# Patient Record
Sex: FEMALE | Race: OTHER | Employment: FULL TIME | URBAN - METROPOLITAN AREA
[De-identification: names, ages, dates, MRNs, and addresses within clinical notes are randomized per-mention and may not be internally consistent; named-entity substitution may affect disease eponyms.]

---

## 2017-02-09 ENCOUNTER — OFFICE VISIT (OUTPATIENT)
Dept: FAMILY MEDICINE CLINIC | Age: 32
End: 2017-02-09

## 2017-02-09 VITALS
SYSTOLIC BLOOD PRESSURE: 118 MMHG | DIASTOLIC BLOOD PRESSURE: 47 MMHG | WEIGHT: 114 LBS | HEIGHT: 57 IN | BODY MASS INDEX: 24.59 KG/M2 | TEMPERATURE: 97.6 F | HEART RATE: 70 BPM

## 2017-02-09 DIAGNOSIS — B35.4 TINEA CORPORIS: Primary | ICD-10-CM

## 2017-02-09 DIAGNOSIS — R10.13 EPIGASTRIC PAIN: ICD-10-CM

## 2017-02-09 RX ORDER — DICYCLOMINE HYDROCHLORIDE 20 MG/1
20 TABLET ORAL
Qty: 30 TAB | Refills: 1 | Status: SHIPPED | OUTPATIENT
Start: 2017-02-09 | End: 2018-03-15

## 2017-02-09 RX ORDER — KETOCONAZOLE 20 MG/G
CREAM TOPICAL 2 TIMES DAILY
Qty: 30 G | Refills: 0 | Status: SHIPPED | OUTPATIENT
Start: 2017-02-09

## 2017-02-09 RX ORDER — PHENOL/SODIUM PHENOLATE
AEROSOL, SPRAY (ML) MUCOUS MEMBRANE
Qty: 60 TAB | Refills: 2 | Status: SHIPPED | OUTPATIENT
Start: 2017-02-09 | End: 2018-03-15

## 2017-02-09 NOTE — PROGRESS NOTES
Subjective:     Chief Complaint   Patient presents with    Diarrhea     pt c/o really bad cramps, diarrhea and nause and some fever x3 days    Skin Problem     pt c/o itchy patch on left side of cheek on face        She  is a 33 y.o. female who presents for evaluation of stomach pain, diarrhea and nausea x 3 days. Pt notes every time she eats, she has to go the bathroom and passes soft stools. Pt reports she has been able to hydrated. Only attempted remedies, no OTC or Rx medications. Also complains     No vomiting, only diarrhea and trace nausea. No blood in diarrhea. Intermittent tactile fevers. Approx 4-5 bouts/day of loose stools. Saw GI specialist last in Nov who was Tx'ing her for \"infection\" and Dr Jose Smith saw Pt last on 11/10 and was going to check more labs. ROS  Gen - no fever/chills  Resp - no dyspnea or cough  CV - no chest pain or WALTERS  Rest per HPI    Past Medical History   Diagnosis Date    H/O mammogram 2012    Pap smear for cervical cancer screening 2012     No past surgical history on file. Current Outpatient Prescriptions on File Prior to Visit   Medication Sig Dispense Refill    dicyclomine (BENTYL) 20 mg tablet Take 20 mg by mouth four (4) times daily as needed.  Omeprazole delayed release (PRILOSEC D/R) 20 mg tablet Sig 1 po bid. [place] 1 [employer] veces al [place] 60 Tab 2    Lactobacillus acidophilus (ACIDOPHILUS) cap Take 2 Caps by mouth two (2) times a day. 120 Cap 1     No current facility-administered medications on file prior to visit.          Objective:     Vitals:    02/09/17 1046   BP: 118/47   Pulse: 70   Temp: 97.6 °F (36.4 °C)   TempSrc: Oral   Weight: 114 lb (51.7 kg)   Height: 4' 9.28\" (1.455 m)       Physical Examination:  General appearance - alert, well appearing, and in no distress  Eyes -sclera anicteric  Neck - supple, no significant adenopathy, no thyromegaly  Chest - clear to auscultation, no wheezes, rales or rhonchi, symmetric air entry  Heart - normal rate, regular rhythm, normal S1, S2, no murmurs, rubs, clicks or gallops  Neurological - alert, oriented, no focal findings or movement disorder noted  Abdomen-BS present/WNL x 4 quads, non-tender/distended, soft,no organomegaly  Derm-anular, hyperpigmented rash on Pt's left cheek approx 2 x 2 cm. Assessment/ Plan:   Liza Lugo was seen today for diarrhea and skin problem. Diagnoses and all orders for this visit:    Tinea corporis  -     ketoconazole (NIZORAL) 2 % topical cream; Apply  to affected area two (2) times a day. Epigastric pain  -     dicyclomine (BENTYL) 20 mg tablet; Take 1 Tab by mouth four (4) times daily as needed. -     Omeprazole delayed release (PRILOSEC D/R) 20 mg tablet; Sig 1 po bid. [place] 1 [employer] veces al [place]      Recommend resumption of PPI and short course of Bentyl. Complete ROIs for GI specialist and [facility]. Re-eval in 4-6 weeks. Start topical antifungal for rash on L cheek. Discussed dosing x 1 week post rash resolution. I have discussed the diagnosis with the patient and the intended plan as seen in the above orders. The patient has received an after-visit summary and questions were answered concerning future plans. I have discussed medication side effects and warnings with the patient as well. The patient verbalizes understanding and agreement with the plan. Follow-up Disposition:  Return in about 4 weeks (around 3/9/2017).

## 2017-02-09 NOTE — PROGRESS NOTES
Coordination of Care  1. Have you been to the ER, urgent care clinic since your last visit? Hospitalized since your last visit? No    2. Have you seen or consulted any other health care providers outside of the [facility] [place] since your last visit? Include any pap smears or colon screening. No    Medications  Medication Reconciliation Performed: no  Patient does not need refills     Learning Assessment Complete?  yes

## 2017-02-09 NOTE — PATIENT INSTRUCTIONS
Tiña: Instrucciones de cuidado - [ Ringworm: Care Instructions ]  Instrucciones de cuidado  La tiña es marty infección de la piel causada por un hongo. No es causada por un gusano. La tiña provoca un salpullido redondeado, con escamas, que podría agrietarse y producir comezón. El salpullido puede extenderse por marty amplia nikko. Un tipo de hongo que causa tiña suele ser [employer] vestuarios y las piscinas ([place]). Crece en zonas tibias y húmedas de la piel, miladis en los pliegues. La tiña puede contagiarse compartiendo toallas, ropa o equipo deportivo. También si se toca a alguien que tiene [facility]. La tiña se trata con marty crema que Bethanne Bidding. Si el salpullido se extiende, podría necesitar pastillas para eliminarlo. A menudo la tiña reaparece después del tratamiento. Si el salpullido se infecta con bacterias, podría necesitar antibióticos. La atención de seguimiento es marty parte clave de carter tratamiento y seguridad. Asegúrese de hacer y acudir a todas las citas, y llame a carter médico si está teniendo problemas. También es marty buena idea saber los resultados de los exámenes y mantener marty lista de los medicamentos que marcie. ¿Cómo puede cuidarse en el hogar? · [employer] medicamentos exactamente miladis le fueron recetados. Llame a carter médico si tiene algún problema con los medicamentos. · Lávese el salpullido con agua y Rexann [place] y séquese earnest. · Trate de aplicar marty crema de venta jamin con clotrimazol o miconazol sobre el salpullido. Otros nombres de garry incluyen Lotrimin, Micatin y Mc jessica. La crema con terbinafina (Lamisil) también está disponible sin receta médica. Extienda la crema más allá del borde del salpullido. Siga las instrucciones del envase. No deje de usar el medicamento solo porque la piel parezca shannon sanado. Es probable que necesite seguir el tratamiento entre 2 y 3 semanas.   · Para evitar otra infección:  ¨ No camine descalzo en lugares públicos miladis gimnasios o vestuarios. Evite compartir toallas y ropa. Use chanclas u otro tipo de calzado en la ducha. ¨ No use ropa ajustada ni deje la piel mojada demetria mucho tiempo, miladis cuando se janene puesto un traje de baño mojado o ropas sudadas. ¿Cuándo debe pedir ayuda? Llame a carter médico ahora mismo o busque atención médica inmediata si:  · Parece que el salpullido se extiende, incluso después del tratamiento. · Tiene señales de infección, tales miladis:  ¨ Dolor, hinchazón o aumento de la temperatura de la piel. ¨ Vetas rojizas cerca de marty herida en la piel. ¨ Pus que supura del salpullido. Willa Hilliard. Preste especial atención a los cambios en carter dylon y asegúrese de comunicarse con carter médico si:  · La tiña no desaparece después de 2 semanas de tratamiento con crema antimicótica (contra los hongos) de venta jamin. ¿Dónde puede encontrar más información en inglés? Maria Teresa Desir a http://irene-danny.info/. Jason Godoy [id number] en la búsqueda para aprender más acerca de \"Tiña: Instrucciones de cuidado - [ Ringworm: Care Instructions ]. \"  Revisado: 5 febrero, 2016  Versión del contenido: 11.1  © 6301-7554 Healthwise, Incorporated. Las instrucciones de cuidado fueron adaptadas bajo licencia por [employer] (which disclaims liability or warranty for this information). Si usted tiene [employer] [place] afección médica o sobre estas instrucciones, siempre pregunte a carter profesional de dylon. Healthwise, Incorporated niega toda garantía o responsabilidad por carter uso de esta información.

## 2017-02-09 NOTE — PROGRESS NOTES
Statements below were documented by Dirk Cavazos RN Patient discharged home with AVS and Medication teaching . No further questions. Completed and signed release of records forms to receive records from [facility] and [address] [place] [address]. Dr. Jacek Arvizu . Patient to see registrar to make follow up appointment.  Dirk Cavazos RN

## 2017-04-03 ENCOUNTER — OFFICE VISIT (OUTPATIENT)
Dept: FAMILY MEDICINE CLINIC | Age: 32
End: 2017-04-03

## 2017-04-03 VITALS
DIASTOLIC BLOOD PRESSURE: 57 MMHG | TEMPERATURE: 98 F | HEART RATE: 71 BPM | HEIGHT: 57 IN | BODY MASS INDEX: 25.46 KG/M2 | SYSTOLIC BLOOD PRESSURE: 110 MMHG | WEIGHT: 118 LBS

## 2017-04-03 DIAGNOSIS — K64.1 SECOND DEGREE HEMORRHOIDS: Primary | ICD-10-CM

## 2017-04-03 RX ORDER — HYDROCORTISONE ACETATE 25 MG/1
25 SUPPOSITORY RECTAL EVERY 12 HOURS
Qty: 12 SUPPOSITORY | Refills: 2 | Status: SHIPPED | OUTPATIENT
Start: 2017-04-03 | End: 2018-03-15

## 2017-04-03 NOTE — PROGRESS NOTES
Coordination of Care  1. Have you been to the ER, urgent care clinic since your last visit? Hospitalized since your last visit? No    2. Have you seen or consulted any other health care providers outside of the [address] [place] [address] since your last visit? Include any pap smears or colon screening. No    Medications  Medication Reconciliation Performed: no  Patient does not need refills     Learning Assessment Complete?  yes

## 2017-04-03 NOTE — PATIENT INSTRUCTIONS
Hydrocortisone (Dentro de el recto)   Trata la inflamación del recto y el ano. Puede que Shereen Holy se use para tratar la colitis ulcerativa y trastornos similares. Diana medicamento es un corticosteroide. Katie(s):Alla-Med Hc, Anucort-HC, Anusol-HC, Cortifoam, GRx HiCort 25, Hemmorex-HC, Procto-Kit 1%, Procto-Kit 2.5%, Procto-José Manuel, Proctocort, Proctozone-Hc, Rectacort-HC, Rectasol-Hc   Existen muchas otras marcas de [place]. Diana medicamento no debe ser usado cuando:   Diana medicamento no es adecuado para todas las personas. No lo use si usted alguna vez ha tenido marty reacción alérgica a la hidrocortisona o si usted tiene marty infección de hongos. Forma de usar diana medicamento:   Crema, Supositorio, Espuma  · Tómese carter medicamento según derian indicaciones. Puede ser necesario cambiar carter dosis varias veces hasta encontrar la dosis más conveniente para usted. · Lave derian saman con agua y jabón antes y después de insertar el medicamento. · Siga las instrucciones que vienen con el medicamento para preparar la espuma o supositorio. · Espuma:   ¨ No use el recipiente del aerosol para aplicarse el medicamento directamente en el recto. Solamente use el aplicador de espuma que viene con el medicamento. ¨ Conecte el aplicador a la tapa del recipiente del aerosol. Agite el recipiente por 5 a 10 segundos shereen antes de usarlo. ¨ Lave el aplicador de la espuma con agua tibia y jabón después de usarlo. ¨ Guarde la espuma a temperatura ambiente y lejos del calor y la iván directa. No la refrigere. · Supositorio:   ¨ Quite el papel aluminio o envoltura del supositorio antes de aplicárselo. ¨ Después de aplicarse el medicamento, quédese acostado por aproximadamente 15 minutos para que el supositorio no se salga antes de derretirse. Luego lávese las saman otra vez. ¨ Usted puede guardar los supositorios en el refrigerador sriram no los congele.   · Dosis olvidada: Si olvida marty dosis de [employer], tómelo lo [employer] posible. Si es radhika hora de carter próxima dosis, omita la dosis olvidada y [employer] carter dosis regular. No use medicamento adicional para reponer la dosis olvidada. Medicamentos y [employer] que debe evitar:   Consulte con carter médico o farmacéutico antes de usar cualquier medicamento, incluyendo los que compra sin receta médica, las vitaminas y los productos herbales. · Algunos medicamentos y alimentos pueden afectar el funcionamiento de la hidrocortisona. Dígale a carter médico si usted [employer]es alguno de los siguientes:   ¨ Anfotericina B, aminoglutetimida, carbamazepina, colestiramina, ciclosporina, digitalis, isoniazid, ketoconazol o fenitoína  ¨ Antibióticos, incluyendo azitromicina, claritromicina, eritromicina o rifampina  ¨ Anticoagulante, incluyendo warfarina  ¨ Diurético  ¨ Estrógeno, incluyendo anticonceptivos de uso oral  ¨ Insulina u otros medicamentos para la diabetes  ¨ AINEs, incluyendo aspirina, diclofenac, ibuprofeno, naproxeno o celecoxib  · Diana medicamento podría interferir con las vacunas. Pregunte a carter médico antes de recibir [employer] para gripe o cualquier otra vacuna. Precauciones demetria el uso de diana medicamento:   · Dígale a carter médico si usted está embarazada o dando de lactar, si tiene problemas cardíacos, presión arterial ander, diabetes, enfermedad del riñón, cirrosis, osteoporosis, problemas de la tiroides o miastenia grave. Dígale a carter médico si usted tiene problemas digestivos, incluyendo úlceras, colitis o diverticulitis. · Es probable que diana medicamento cause los siguientes problemas:  ¨ Cataratas o glaucoma  ¨ Presión arterial ander  ¨ Riesgo aumentado de osteoporosis  ¨ Problemas de la glándula suprarrenal  · Es probable que diana medicamento le provoque infecciones con más facilidad. Dígale a carter médico si usted tiene algún tipo de infección antes de iniciar el tratamiento. Evite personas enfermas, o que tengan infecciones.  Si usted está expuesto a la varicela o el sarampión, dígale a carter médico de inmediato. · Diana medicamento puede atrasar el crecimiento en niños. Si usted ana que carter aston no está creciendo [employer] medicamento, hable con carter médico.  · Llame a carter médico si derian síntomas no mejoran, o si Lovena Abhishek. · No suspenda el uso de diana medicamento súbitamente. Carter médico necesitará disminuir carter dosis poco a poco antes de suspender el medicamento por completo. · Dígale a todo médico o dentista encargado de atenderle que usted está usando [employer]. Puede que diana medicamento afecte algunos resultados de [employer]. · Guarde todos los medicamentos fuera del alcance de los niños y nunca comparta derian medicamentos con otras personas. Efectos secundarios que pueden presentarse demetria el uso de diana medicamento:   Consulte inmediatamente con el médico si nota cualquiera de estos efectos secundarios:  · Reacción alérgica: picazón o ronchas, hinchazón en la cedric o en las saman, hinchazón o cosquilleo en la boca o garganta, opresión en el pecho, dificultad para respirar  · Pecas oscuras, cambios en el color de la piel, frío, debilidad, cansancio, náusea, vómito, pérdida de peso  · Depresión, cambios de humor, dificultad para dormir, pensamientos, sentimientos o comportamientos inusuales  · [address], más sed, calambres musculares, náusea o vómito, ritmo cardíaco irregular  · Dolor de vanessa, cambios en la vista  · Monster, escalofríos, tos, dolor de garganta, yeison de cuerpo  · Subida de peso rápida, inflamación en las saman, tobillos o pies  Consulte con el médico si nota los siguientes efectos secundarios menos graves:   · Irritación, ardor o comezón rectal  Consulte con el médico si nota otros efectos secundarios que ana son causados por diana medicamento. [place] a carter médico para consejo médico sobre efectos secundarios. Usted puede reportar derian efectos secundarios al FDA al [phone].   © 2016 [address] is for End User's use only and may not be sold, redistributed or otherwise used for commercial purposes. Esta información es sólo para uso en educación. Carter intención no es darle un consejo médico sobre enfermedades o tratamientos. Colsulte con carter [place] farmacéutico antes de seguir cualquier régimen médico para saber si es seguro y efectivo para usted.

## 2017-04-03 NOTE — MR AVS SNAPSHOT
Visit Information Felisha Gu Personal Médico Departamento Teléfono del [facility]. Número de visita 4/3/2017  1:15 PM Hipolito Ortiz NP KAMRON-GINTER Luh Edel Naalehu [phone] Follow-up Instructions Return in about 6 weeks (around 5/15/2017). Upcoming Health Maintenance Date Due DTaP/Tdap/Td series (1 - Tdap) 6/12/2004 PAP AKA CERVICAL CYTOLOGY 6/12/2004 INFLUENZA AGE 9 TO ADULT 8/1/2016 Alergias  Review Complete El: 4/3/2017 Por: Hipolito Ortiz NP  
 Kedar Mendoza del:  4/3/2017 No Known Allergies Vacunas actuales Marcellus Cheadle Marjie Christian Influenza Vaccine PF 11/23/2013 No revisadas esta visita You Were Diagnosed With   
  
 Melanie Back Second degree hemorrhoids    -  Primary ICD-10-CM: K64.1 ICD-9-CM: 455.8 Partes vitales PS Pulso Temperatura [place] ( percentil de crecimiento) Peso (percentil de crecimiento) LMP (última celeste) 110/57 (BP 1 Location: Left arm, BP Patient Position: Sitting) 71 98 °F (36.7 °C) (Oral) 4' 9.28\" (1.455 m) 118 lb (53.5 kg) 03/17/2017 BMI [facility]) Estado obstétrico Estatus de tabaquísmo 25.28 kg/m2 Having regular periods Never Smoker Historial de signos vitales BMI and BSA Data Body Mass Index Body Surface Area  
 25.28 kg/m 2 1.47 m 2 Julio [email or web address] Pharmacy Name Phone [facility] [phone] [facility], [address] [facility] [phone] Harvey lista de medicamentos actualizada Lista actualizada el: 4/3/17  2:40 PM.  Novaklucien Demarco use harvey lista de medicamentos más reciente. dicyclomine 20 mg tablet También conocido miladis:  BENTYL Take 1 Tab by mouth four (4) times daily as needed. hydrocortisone 25 mg Supp También conocido miladis:  ANUSOL-HC Insert 1 Suppository into rectum every twelve (12) hours.   
  
 ketoconazole 2 % topical cream  
 También conocido miladis:  Vy Dickerson Apply  to affected area two (2) times a day. Lactobacillus acidophilus Cap También conocido miladis:  ACIDOPHILUS Take 2 Caps by mouth two (2) times a day. Omeprazole delayed release 20 mg tablet También conocido miladis:  PRILOSEC D/R Sig 1 po bid. [place] 1 [employer] al michoacano Recetas Enviado a la [place] Refills  
 hydrocortisone (ANUSOL-HC) 25 mg supp 2 Sig: Insert 1 Suppository into rectum every twelve (12) hours. Class: Normal  
 Pharmacy: [facility], [address] [place] [address] #: [phone] Route: Rectal  
  
Instrucciones de seguimiento Return in about 6 weeks (around 5/15/2017). Instrucciones para el Paciente Hydrocortisone (Dentro de el recto) Trata la inflamación del recto y el ano. Puede que [place] se use para tratar la colitis ulcerativa y trastornos similares. Diana medicamento es un corticosteroide. Katie(s):Alla-Med Hc, Anucort-HC, Anusol-HC, Cortifoam, GRx HiCort 25, Hemmorex-HC, Procto-Kit 1%, Procto-Kit 2.5%, Procto-José Manuel, Proctocort, Proctozone-Hc, Rectacort-HC, Rectasol-Hc Existen muchas otras marcas de Dueñas. Diana medicamento no debe ser usado cuando:  
Diana medicamento no es adecuado para todas las personas. No lo use si usted alguna vez ha tenido marty reacción alérgica a la hidrocortisona o si usted tiene marty infección de hongos. Forma de usar diana medicamento:  
Antoine [place] · Tómese carter medicamento según derian indicaciones. Puede ser necesario cambiar carter dosis varias veces hasta encontrar la dosis más conveniente para usted. · Lave derian saman con agua y jabón antes y después de insertar el medicamento. · Siga las instrucciones que vienen con el medicamento para preparar la espuma o supositorio.  
· Espuma:  
¨ No use el recipiente del aerosol para aplicarse el medicamento directamente en el recto. Solamente use el aplicador de espuma que viene con el medicamento. ¨ Conecte el aplicador a la tapa del recipiente del aerosol. Agite el recipiente por 5 a 10 segundos shereen antes de usarlo. ¨ Lave el aplicador de la espuma con agua tibia y jabón después de usarlo. ¨ Guarde la espuma a temperatura ambiente y lejos del calor y la iván directa. No la refrigere. · Supositorio: ¨ Quite el papel aluminio o envoltura del supositorio antes de aplicárselo. ¨ Después de aplicarse el medicamento, quédese acostado por aproximadamente 15 minutos para que el supositorio no se salga antes de derretirse. Luego lávese las saman otra vez. ¨ Usted puede guardar los supositorios en el refrigerador sriram no los congele. · Dosis olvidada: Si olvida marty dosis de carter medicamento, tómelo lo más pronto posible. Si es radhika hora de carter próxima dosis, omita la dosis olvidada y [employer] carter dosis regular. No use medicamento adicional para reponer la dosis olvidada. Medicamentos y [employer] Tire que debe evitar:  
Consulte con carter médico o farmacéutico antes de usar cualquier medicamento, incluyendo los que compra sin receta médica, las vitaminas y los productos herbales. · Algunos medicamentos y alimentos pueden afectar el funcionamiento de la hidrocortisona. Dígale a carter médico si usted Mailgun usando alguno de los siguientes: ¨ Anfotericina B, aminoglutetimida, carbamazepina, colestiramina, ciclosporina, digitalis, isoniazid, ketoconazol o fenitoína ¨ Antibióticos, incluyendo azitromicina, claritromicina, eritromicina o rifampina ¨ Anticoagulante, incluyendo Thom Holstein ¨ Diurético 
¨ Estrógeno, incluyendo anticonceptivos de uso oral 
¨ Insulina u otros medicamentos para la diabetes ¨ AINEs, incluyendo aspirina, diclofenac, ibuprofeno, naproxeno o celecoxib · Diana medicamento podría interferir con las vacunas. Pregunte a carter médico antes de recibir Bill Blue para gripe o cualquier otra vacuna. Precauciones demetria el uso de diana medicamento:  
· Dígale a carter médico si usted está embarazada o dando de lactar, si tiene problemas cardíacos, presión arterial ander, diabetes, enfermedad del riñón, cirrosis, osteoporosis, problemas de la tiroides o miastenia grave. Dígale a carter médico si usted tiene problemas digestivos, incluyendo úlceras, colitis o diverticulitis. · Es probable que diana medicamento cause los siguientes problemas: 
¨ Cataratas o glaucoma ¨ Presión arterial ander ¨ Riesgo aumentado de osteoporosis ¨ Problemas de la glándula suprarrenal 
· Es probable que diana medicamento le provoque infecciones con más facilidad. Dígale a carter médico si usted tiene algún tipo de infección antes de iniciar el tratamiento. Evite personas enfermas, o que tengan infecciones. Si usted está expuesto a la varicela o el sarampión, dígale a carter médico de inmediato. · Diana medicamento puede atrasar el crecimiento en niños. Si usted ana que carter aston no está creciendo [place] medicamento, hable con carter médico. 
· Llame a carter médico si derian síntomas no mejoran, o si [place] [employer]. · No suspenda el uso de diana medicamento súbitamente. Carter médico necesitará disminuir carter dosis poco a poco antes de suspender el medicamento por completo. · Dígale a todo médico o dentista encargado de atenderle que usted está usando [place]. Puede que diana medicamento afecte algunos resultados de imageloop. · Guarde todos los medicamentos fuera del alcance de los niños y nunca comparta derian medicamentos con otras personas. Efectos secundarios que pueden presentarse demetria el uso de diana medicamento:  
Consulte inmediatamente con el médico si nota cualquiera de estos efectos secundarios: 
· Reacción alérgica: [place] o ronchas, hinchazón en la cedric o en las saman, hinchazón o cosquilleo en la boca o garganta, opresión en el pecho, dificultad para respirar · Pecas oscuras, cambios en el color de la piel, frío, debilidad, cansancio, náusea, vómito, pérdida de [address] · Depresión, cambios de humor, dificultad para dormir, pensamientos, sentimientos o comportamientos inusuales · [employer], más sed, calambres musculares, náusea o vómito, ritmo cardíaco irregular · Dolor de vanessa, cambios en la vista · Karina Chandana, escalofríos, tos, dolor de garganta, yeison de cuerpo · Subida de peso rápida, inflamación en las saman, tobillos o pies Consulte con el médico si nota los siguientes efectos secundarios menos graves: · Irritación, ardor o comezón rectal 
Consulte con el médico si nota otros efectos secundarios que ana son causados por sapphire medicamento. [place] a harvey médico para consejo médico sobre efectos secundarios. Usted puede reportar derian efectos secundarios al FDA al [phone]. © 2016 [address] is for End User's use only and may not be sold, redistributed or otherwise used for commercial purposes. Esta información es sólo para uso en educación. Harvey intención no es darle un consejo médico sobre enfermedades o tratamientos. Colsulte con harvey Hellen Waqas farmacéutico antes de seguir cualquier régimen médico para saber si es seguro y efectivo para usted. Introducing [facility]! Guero Cristina introduce portal paciente MyChart . Ahora se puede acceder a partes de harvey expediente médico, enviar por correo electrónico la oficina de harvey médico y solicitar renovaciones de medicamentos en línea. En harvey navegador de Internet , Frutoso Dixon a [email or web address] Venus clic en el usuario por Ernesto Almendarez? Madelyn Pilar clic aquí en la sesión Carlos Alberto Flow. Verá la página de registro [place]. Ingrese harvey código de [facility] rosemarie y miladis aparece a continuación. Usted no tendrá que [employer] código después de shannon completado el proceso de registro .  Si usted no se inscribe antes de la fecha de caducidad , debe solicitar un nuevo código. · MyChart Código de acceso : [id number] Expires: 7/2/2017  2:40 PM 
 
Ingresa los últimos cuatro dígitos de carter Número de Seguro Social ( xxxx ) y fecha de nacimiento ( dd / mm / aaaa ) miladis se indica y venus clic en Enviar. Usted será llevado a la siguiente página de registro . Crear un ID MyChart . Esta será carter ID de inicio de sesión de MyChart y no puede ser [place] , por lo que pensar en marty que es Louise Blanks y fácil de recordar . Crear marty contraseña MyChart . Usted puede cambiar carter contraseña en cualquier momento . Ingrese carter Password Reset de preguntas y Mccann . [place] se puede utilizar en un momento posterior si usted olvida carter contraseña. Introduzca carter dirección de correo electrónico . Sharon Staples recibirá marty notificación por correo electrónico cuando la nueva información está disponible en MyChart . Idella Mosinee clic en Registrarse. Shane Jazmin sonny y descargar porciones de carter expediente médico. 
Venus clic en el enlace de descarga del menú Resumen para descargar marty copia portátil de carter información médica . Si tiene [employer] , por favor visite la sección de preguntas frecuentes del sitio web MyChart . Recuerde, MyChart NO es que se utilizará para las necesidades urgentes. Para emergencias médicas , llame al 911 . Ahora disponible en carter iPhone y Android ! Por favor proporcione sapphire resumen de la documentación de cuidado a carter próximo proveedor. Your primary care clinician is listed as NONE. If you have any questions after today's visit, please call [phone].

## 2017-04-03 NOTE — PROGRESS NOTES
Subjective:     Chief Complaint   Patient presents with    Follow-up     abdominal pain and anal pain        She  is a 33 y.o. female who presents for re-evaluation of GERD S&S and Tx from [address] [place] [address]. Pt notes her abdominal pain resolved after one week after starting Rx. Pt has noted some intermittent rectal pain. Onset > 1 year ago and had a colonscopy done approx 9 months ago that revealed   Hemorrhoids. Pain is intermittent. Has noted some intermittent abdominal bloating before meals. Intermittent constipation/straining. Denies any N/V/D. Saw GI through [facility] when she was a Pt at [facility]. ROS  Gen - no fever/chills  Resp - no dyspnea or cough  CV - no chest pain or WALTERS  Rest per HPI    Past Medical History:   Diagnosis Date    H/O mammogram 2012    Pap smear for cervical cancer screening 2012     No past surgical history on file. Current Outpatient Prescriptions on File Prior to Visit   Medication Sig Dispense Refill    dicyclomine (BENTYL) 20 mg tablet Take 1 Tab by mouth four (4) times daily as needed. 30 Tab 1    Omeprazole delayed release (PRILOSEC D/R) 20 mg tablet Sig 1 po bid. [place] 1 [place] veces al michoacano 60 Tab 2    ketoconazole (NIZORAL) 2 % topical cream Apply  to affected area two (2) times a day. 30 g 0    Lactobacillus acidophilus (ACIDOPHILUS) cap Take 2 Caps by mouth two (2) times a day. 120 Cap 1     No current facility-administered medications on file prior to visit.          Objective:     Vitals:    04/03/17 1319   BP: 110/57   Pulse: 71   Temp: 98 °F (36.7 °C)   TempSrc: Oral   Weight: 118 lb (53.5 kg)   Height: 4' 9.28\" (1.455 m)       Physical Examination:  General appearance - alert, well appearing, and in no distress  Eyes -sclera anicteric  Neck - supple, no significant adenopathy, no thyromegaly  Chest - clear to auscultation, no wheezes, rales or rhonchi, symmetric air entry  Heart - normal rate, regular rhythm, normal S1, S2, no murmurs, rubs, clicks or gallops  Neurological - alert, oriented, no focal findings or movement disorder noted  Abdomen-BS present/WNL x 4 quads, non-tender/distended, soft,no organomegaly    Assessment/ Plan:   Melissa Spann was seen today for follow-up. Diagnoses and all orders for this visit:    Second degree hemorrhoids  -     hydrocortisone (ANUSOL-HC) 25 mg supp; Insert 1 Suppository into rectum every twelve (12) hours. Reviewed extensive records of imaging, colonoscopy and lab work done by PCP and GI. Hemorrhoids noted on colonoscopy report. Given Pt's S&S today and her prior exam findings, recommend Tx w/ anusol HC supp BID x 3-4 days then PRN. Re-eval S&S in 4-6 weeks. ? Bloating cause. Resume PPI. I have discussed the diagnosis with the patient and the intended plan as seen in the above orders. The patient has received an after-visit summary and questions were answered concerning future plans. I have discussed medication side effects and warnings with the patient as well. The patient verbalizes understanding and agreement with the plan. Follow-up Disposition:  Return in about 6 weeks (around 5/15/2017).

## 2017-04-03 NOTE — PROGRESS NOTES
AVS printed and reviewed. Good Rx wallet card given for generic Anusol supp for approx cost of $14 at [facility] pharmacy. Sent to registration to schedule f/u appt. Discussion assisted by CAV , Chinyere Alvarez.

## 2017-05-15 ENCOUNTER — OFFICE VISIT (OUTPATIENT)
Dept: FAMILY MEDICINE CLINIC | Age: 32
End: 2017-05-15

## 2017-05-15 VITALS
DIASTOLIC BLOOD PRESSURE: 62 MMHG | SYSTOLIC BLOOD PRESSURE: 119 MMHG | WEIGHT: 117 LBS | HEIGHT: 58 IN | HEART RATE: 74 BPM | TEMPERATURE: 97.5 F | BODY MASS INDEX: 24.56 KG/M2

## 2017-05-15 DIAGNOSIS — K64.9 HEMORRHOIDS, UNSPECIFIED HEMORRHOID TYPE: Primary | ICD-10-CM

## 2017-05-15 DIAGNOSIS — K62.89 RECTAL PAIN: ICD-10-CM

## 2017-05-15 NOTE — PROGRESS NOTES
Printed AVS, provided to pt and reviewed. Pt indicated understanding and had no questions. Explained Access Now process to pt and told them that one of our financial screeners will call them at home for financial information. Samuel Jones was the .  Felicia Nunez RN

## 2017-05-15 NOTE — PROGRESS NOTES
Coordination of Care  1. Have you been to the ER, urgent care clinic since your last visit? Hospitalized since your last visit? No    2. Have you seen or consulted any other health care providers outside of the [address] [place] [address] since your last visit? Include any pap smears or colon screening. Yes When: april 21, 2017 Where: [facility] dept Reason for visit: pelvic exam    Medications  Medication Reconciliation Performed: no  Patient does not know need refills     Learning Assessment Complete?  yes

## 2017-05-15 NOTE — PROGRESS NOTES
Assessment/Plan:    Collin Evangelista was seen today for abdominal pain. Diagnoses and all orders for this visit:    Hemorrhoids, unspecified hemorrhoid type  -     REFERRAL TO COLON AND RECTAL SURGERY    Rectal pain  -     REFERRAL TO COLON AND RECTAL SURGERY    Please also see note from 4/17 which includes more History of present illness  No relief with anusol  Would appreciate opinion of specialist for this chronic problem  Get records from recent office visit with gyn        Follow-up Disposition:  Return if symptoms worsen or fail to improve. Vi Hall PA-C  [address] [place], [place] [address]juan expressed understanding of this plan. An AVS was printed and given to the patient.      ----------------------------------------------------------------------    Chief Complaint   Patient presents with    Abdominal Pain     f/u pain is still the same       History of Present Illness:    Pt here for f/up on hemorrhoid and anal pain after starting anusol-HC for this problem. The problem started about 1 year ago and was associated with anal pain, blood with wiping after BM, pus at times in BM. She has seen GI and had colonoscopy and endoscopy, rx for H. Pylori with reported negative f/up testing, recent gyn eval twice including a pelvic ultrasound (she was told that the results are normal)    Past Medical History:   Diagnosis Date    H/O mammogram 2012    Pap smear for cervical cancer screening 2012       Current Outpatient Prescriptions   Medication Sig Dispense Refill    hydrocortisone (ANUSOL-HC) 25 mg supp Insert 1 Suppository into rectum every twelve (12) hours. 12 Suppository 2    dicyclomine (BENTYL) 20 mg tablet Take 1 Tab by mouth four (4) times daily as needed. 30 Tab 1    Omeprazole delayed release (PRILOSEC D/R) 20 mg tablet Sig 1 po bid. [place] [address] [employer] veces al michoacano 60 Tab 2    ketoconazole (NIZORAL) 2 % topical cream Apply  to affected area two (2) times a day.  30 g 0    Lactobacillus acidophilus (ACIDOPHILUS) cap Take 2 Caps by mouth two (2) times a day. 120 Cap 1       No Known Allergies    Social History   Substance Use Topics    Smoking status: Never Smoker    Smokeless tobacco: None    Alcohol use No       No family history on file.     Physical Exam:     Visit Vitals    /62 (BP 1 Location: Right arm, BP Patient Position: Sitting)    Pulse 74    Temp 97.5 °F (36.4 °C) (Oral)    Ht 4' 9.76\" (1.467 m)    Wt 117 lb (53.1 kg)    LMP 05/02/2017    BMI 24.66 kg/m2     Looks well  A&Ox3  WDWN NAD  Respirations normal and non labored

## 2017-05-15 NOTE — PATIENT INSTRUCTIONS
Dolor anal: Instrucciones de cuidado - [ Anal Pain: Care Instructions ]  Instrucciones de cuidado  El dolor en la abertura del recto (ano) puede ser causado por diarrea, estreñimiento o rascarse debido a comezón rectal. Kayla Marek causa común del dolor anal es un desgarro en el revestimiento del recto inferior (fisura anal). Diana tipo de dolor anal normalmente desaparece cuando se elimina el problema. Las lesiones producidas demetria la práctica de sexo anal o por un objeto colocado en el recto también pueden causar dolor. Bri causa poco frecuente del dolor anal son los espasmos de los músculos del recto. Algunas de estas afecciones podrían causar un leve sangrado. El tratamiento en el hogar por lo general puede aliviar el dolor anal. Si persiste el dolor anal, carter médico podría recetarle medicamentos para aliviar el dolor y otros síntomas. Según la causa, es posible que necesite otro tratamiento. La atención de seguimiento es rbi parte clave de carter tratamiento y seguridad. Asegúrese de hacer y acudir a todas las citas, y llame a carter médico si está teniendo problemas. También es bri buena idea saber los resultados de derian exámenes y mantener bri lista de los medicamentos que marcie. ¿Cómo puede cuidarse en el hogar? · Siéntese en unas pocas pulgadas de agua tibia (baño de asiento) 3 veces al día y después de evacuar el intestino. El agua tibia [place] las [place]. No añada jabones, sales ni champús al [employer]. · Fadia abundantes líquidos, los suficientes miladis para que carter orina sea de color amarillo viviana o transparente miladis el agua. Si tiene [employer], el corazón o el hígado y tiene que [employer] líquidos, hable con carter médico antes de aumentar carter consumo. · Incluya en carter dieta diaria alimentos ricos en fibra, miladis frutas, verduras, frijoles (habichuelas) y granos integrales. · [address] un suplemento de [place], miladis Benefiber, Citrucel o Metamucil, todos [place].  Shanice y siga todas las instrucciones de la etiqueta. · Vaya al baño cuando tenga la necesidad de Celina. O cuando pueda, programe un momento cada día para evacuar. Marty rutina diaria puede ayudar. Tómese carter tiempo y no se esfuerce al [place] & [facility] Financial. Ion no pase demasiado tiempo sentado en el inodoro. · Apoye los pies sobre un banco o taburete pequeño cuando se siente en el inodoro. [place] ayuda a flexionar las caderas y coloca la pelvis en posición de cuclillas. · Carter médico podría recomendarle un laxante de [employer], miladis, por ejemplo, Miralax, la Leche de Magnesia (Milk of [place]) o Ex-Lax. Shanice y siga todas las instrucciones de la [place] y no use laxantes de manera prolongada. · No use cremas o pomadas de venta jamin sin shannon consultado a carter médico. Es posible que algunas no nigel de [place]. · Utilice toallitas para bebés o toallitas medicinales, miladis Preparation H o Tucks, en lugar de papel higiénico para limpiarse después de evacuar el intestino. Estos productos no irritan el ano. · Sea hilda con los medicamentos. Shanice y siga todas las instrucciones en la etiqueta. Si el médico le amber un analgésico (medicamento para el dolor) recetado, tómelo según las indicaciones. Si no está tomando un analgésico recetado, pregúntele a carter médico si puede kirill un medicamento de [employer]. ¿Cuándo debe pedir ayuda? Llame al 911 en cualquier momento que considere que necesita atención de [place]. Por ejemplo, llame si:  · Se desmayó (perdió el conocimiento). Llame a carter médico ahora mismo o busque atención médica inmediata si:  · Tiene fiebre. · Tiene hinchazón, un bulto, marty llaga o un crecimiento nuevo en el ano o en la nikko que lo rodea. · Verónica heces son negruzcas y parecidas al alquitrán o tienen rastros de [facility]. Preste especial atención a los cambios en carter dylon y asegúrese de comunicarse con carter médico si:  · No mejora miladis se esperaba. ¿Dónde puede encontrar más información en inglés? Bill Ferraro a http://irene-danny.info/.   Frida Schofield B355 en la búsqueda para aprender más acerca de \"Dolor anal: Instrucciones de cuidado - [ Anal Pain: Care Instructions ]. \"  Revisado: 9 agosto, 2016  Versión del contenido: 11.2  © 8691-5020 Healthwise, Incorporated. Las instrucciones de cuidado fueron adaptadas bajo licencia por [employer] (which disclaims liability or warranty for this information). Si usted tiene [place] afección médica o sobre estas instrucciones, siempre pregunte a carter profesional de dylon. Healthwise, Incorporated niega toda garantía o responsabilidad por carter uso de esta información.

## 2017-05-16 DIAGNOSIS — K62.89 RECTAL PAIN: Primary | ICD-10-CM

## 2017-05-19 ENCOUNTER — TELEPHONE (OUTPATIENT)
Dept: FAMILY MEDICINE CLINIC | Age: 32
End: 2017-05-19

## 2017-05-19 NOTE — TELEPHONE ENCOUNTER
Deidre Cedillo called listed number and left voice message for patient to call me back.   Herve Pritchard

## 2017-05-31 ENCOUNTER — TELEPHONE (OUTPATIENT)
Dept: FAMILY MEDICINE CLINIC | Age: 32
End: 2017-05-31

## 2017-05-31 NOTE — TELEPHONE ENCOUNTER
Bartolo Tatum called listed number and left voice message for patient to call me back.   Raymond Esquivel

## 2017-05-31 NOTE — TELEPHONE ENCOUNTER
Corinna Yadav spoke to [email or web address] and scheduled AN screening appointment on 6/6/17 @ 9:30 am.    Aftab Owen

## 2017-06-06 ENCOUNTER — OFFICE VISIT (OUTPATIENT)
Dept: FAMILY MEDICINE CLINIC | Age: 32
End: 2017-06-06

## 2017-06-06 DIAGNOSIS — Z71.89 COUNSELING AND COORDINATION OF CARE: Primary | ICD-10-CM

## 2017-06-07 NOTE — PROGRESS NOTES
Access Now application was completed and sent to Alber Albright at [facility] with Archana Gonzales on 6/7/17.  Vilma Tipton

## 2018-03-15 ENCOUNTER — OFFICE VISIT (OUTPATIENT)
Dept: FAMILY MEDICINE CLINIC | Age: 33
End: 2018-03-15

## 2018-03-15 ENCOUNTER — HOSPITAL ENCOUNTER (OUTPATIENT)
Dept: LAB | Age: 33
Discharge: HOME | End: 2018-03-15

## 2018-03-15 VITALS
DIASTOLIC BLOOD PRESSURE: 66 MMHG | WEIGHT: 120 LBS | TEMPERATURE: 98.2 F | HEART RATE: 64 BPM | SYSTOLIC BLOOD PRESSURE: 127 MMHG | BODY MASS INDEX: 25.89 KG/M2 | HEIGHT: 57 IN

## 2018-03-15 DIAGNOSIS — G44.209 ACUTE NON INTRACTABLE TENSION-TYPE HEADACHE: Primary | ICD-10-CM

## 2018-03-15 DIAGNOSIS — G44.209 ACUTE NON INTRACTABLE TENSION-TYPE HEADACHE: ICD-10-CM

## 2018-03-15 DIAGNOSIS — R82.998 PINK-COLORED URINE: ICD-10-CM

## 2018-03-15 DIAGNOSIS — R01.1 HEART MURMUR, SYSTOLIC: ICD-10-CM

## 2018-03-15 LAB
ANION GAP SERPL CALC-SCNC: 7 MMOL/L (ref 5–15)
BASOPHILS # BLD: 0 K/UL (ref 0–0.1)
BASOPHILS NFR BLD: 0 % (ref 0–1)
BUN SERPL-MCNC: 7 MG/DL (ref 6–20)
BUN/CREAT SERPL: 14 (ref 12–20)
CALCIUM SERPL-MCNC: 9.1 MG/DL (ref 8.5–10.1)
CHLORIDE SERPL-SCNC: 105 MMOL/L (ref 97–108)
CO2 SERPL-SCNC: 28 MMOL/L (ref 21–32)
CREAT SERPL-MCNC: 0.49 MG/DL (ref 0.55–1.02)
DIFFERENTIAL METHOD BLD: ABNORMAL
EOSINOPHIL # BLD: 0.1 K/UL (ref 0–0.4)
EOSINOPHIL NFR BLD: 2 % (ref 0–7)
ERYTHROCYTE [DISTWIDTH] IN BLOOD BY AUTOMATED COUNT: 16.9 % (ref 11.5–14.5)
GLUCOSE SERPL-MCNC: 76 MG/DL (ref 65–100)
HCT VFR BLD AUTO: 36.5 % (ref 35–47)
HGB BLD-MCNC: 11 G/DL (ref 11.5–16)
IMM GRANULOCYTES # BLD: 0 K/UL (ref 0–0.04)
IMM GRANULOCYTES NFR BLD AUTO: 0 % (ref 0–0.5)
LYMPHOCYTES # BLD: 2.9 K/UL (ref 0.8–3.5)
LYMPHOCYTES NFR BLD: 53 % (ref 12–49)
MCH RBC QN AUTO: 23 PG (ref 26–34)
MCHC RBC AUTO-ENTMCNC: 30.1 G/DL (ref 30–36.5)
MCV RBC AUTO: 76.2 FL (ref 80–99)
MONOCYTES # BLD: 0.5 K/UL (ref 0–1)
MONOCYTES NFR BLD: 9 % (ref 5–13)
NEUTS SEG # BLD: 2 K/UL (ref 1.8–8)
NEUTS SEG NFR BLD: 36 % (ref 32–75)
NRBC # BLD: 0 K/UL (ref 0–0.01)
NRBC BLD-RTO: 0 PER 100 WBC
PLATELET # BLD AUTO: 310 K/UL (ref 150–400)
PMV BLD AUTO: 11.4 FL (ref 8.9–12.9)
POTASSIUM SERPL-SCNC: 4 MMOL/L (ref 3.5–5.1)
RBC # BLD AUTO: 4.79 M/UL (ref 3.8–5.2)
SODIUM SERPL-SCNC: 140 MMOL/L (ref 136–145)
WBC # BLD AUTO: 5.5 K/UL (ref 3.6–11)

## 2018-03-15 PROCEDURE — 80048 BASIC METABOLIC PNL TOTAL CA: CPT | Performed by: FAMILY MEDICINE

## 2018-03-15 PROCEDURE — 81001 URINALYSIS AUTO W/SCOPE: CPT | Performed by: FAMILY MEDICINE

## 2018-03-15 PROCEDURE — 82550 ASSAY OF CK (CPK): CPT | Performed by: FAMILY MEDICINE

## 2018-03-15 PROCEDURE — 85025 COMPLETE CBC W/AUTO DIFF WBC: CPT | Performed by: FAMILY MEDICINE

## 2018-03-15 NOTE — PROGRESS NOTES
Chief Complaint   Patient presents with    Headache     x 5 days     Coordination of Care  1. Have you been to the ER, urgent care clinic since your last visit? Hospitalized since your last visit? No    2. Have you seen or consulted any other health care providers outside of the [address] [place] [address] since your last visit? Include any pap smears or colon screening. No    Does the patient need refills? NO    Learning Assessment Complete?  yes

## 2018-03-15 NOTE — PATIENT INSTRUCTIONS
Dolor de rosana por tensión: Instrucciones de cuidado - [ Tension Headache: Care Instructions ]  Instrucciones de [address] de los yeison de [place] son por tensión. Estos yeison de [place] tienden a repetirse, en especial si usted está bajo estrés. Un dolor de rosana por tensión podría causar dolor o marty sensación de presión en toda la rosana. Es probable que no pueda determinar con precisión el centro del dolor. Si sigue teniendo yeison de rosana por tensión, lo mejor que puede hacer para limitarlos es determinar qué los causa y hacer cambios en esas áreas. La atención de seguimiento es marty parte clave de carter tratamiento y seguridad. Asegúrese de hacer y acudir a todas las citas, y llame a carter médico si está teniendo problemas. También es marty buena idea saber los resultados de los exámenes y mantener marty lista de los medicamentos que marcie. Cómo puede cuidarse en el hogar? · Descanse en un cuarto tranquilo y oscuro con un paño frío sobre la frente hasta que desaparezca el dolor de [place]. Cierre los ojos y trate de relajarse o dormirse. No linda televisión ni rj. Evite usar la computadora. · Utilice marty toalla húmeda tibia o marty almohadilla térmica ajustada a baja temperatura para relajar los músculos rígidos del lorelei y los hombros.  · Pídale a alguien que le fabiana masajes suaves en el lorelei y los hombros.  · [place] los analgésicos (medicamentos para el dolor) exactamente según las indicaciones. ¨ Si el médico le recetó un analgésico, tómelo según las indicaciones. ¨ Si no está tomando un analgésico recetado, pregúntele a carter médico si puede kirill lizzy de [employer]. · Tenga cuidado de no kirill analgésicos con mayor frecuencia que la permitida en las indicaciones porque los yeison de rosana podrían empeorar o aparecer con mayor frecuencia marty vez que el medicamento pierda carter [place].   · Si le da otro dolor de rosana por tensión, deje de hacer lo que esté haciendo y siéntese tranquilo demetria un momento. Cierre los ojos y respire lentamente. Trate de [employer] de la rosana y del lorelei. · Preste atención a los nuevos síntomas que aparecen con el dolor de [place], [place], debilidad o entumecimiento, cambios en la visión o confusión. Podrían ser señales de un problema más grave. Para ayudar a prevenir los yeison de rosana  · [employer] un diario de derian yeison de rosana para poder averiguar qué los produce. Evitar los desencadenantes podría ayudar a prevenir los yeison de [place]. Anote cuándo empieza cada dolor de [place], cuánto dura y cómo es el dolor (palpitante, sigifredo, punzante o sordo). Ponga en la lista cualquier cosa que pudiera shannon desencadenado el dolor de rosana, rosemarie miladis estrés físico o emocional o estar ansioso o deprimido. Otros desencadenantes posibles son Shahida Ishikawa, la earline, la fatiga, Gildardo Screen y la distensión muscular. · Encuentre maneras saludables de [employer] [place] [employer]. Los yeison de [place] son más comunes demetria o shereen después de un momento estresante. Tómese un tiempo para relajarse antes y después de hacer algo que le haya causado un dolor de rosana en el pasado. · Venus ejercicio a diario para aliviar el estrés. Hacer ejercicios de relajación podría ayudarle a reducir la tensión. · Duerma lo suficiente. · Coma con regularidad y earnest. Largos períodos sin comer pueden provocar un dolor de rosana. · Regálese un masaje. Algunas personas encuentran que los masajes son Lasha Berth para aliviar la tensión. · Trate de mantener derian músculos relajados mediante marty buena postura. Revise si tiene [employer] [employer] de la [place], la cedric, el lorelei y los hombros y trate de relajarlos. Cuando se siente en un escritorio, cambie de posición con frecuencia y estírese por 27 segundos cada hora. · Reduzca la fatiga ocular a causa de la computadora parpadeando con frecuencia y apartando la mirada de la pantalla a menudo.  Asegúrese de tener lentes adecuados y de que carter monitor esté colocado de [employer], miladis a un brazo de distancia. Cuándo debe pedir ayuda? Llame al 911 en cualquier momento que piense que puede necesitar atención de [place]. Por ejemplo, llame si:  ? · Tiene señales de un ataque cerebral. Estas pueden incluir:  ¨ Entumecimiento, parálisis o debilidad repentinos en la cedric, el brazo o la pierna, sobre todo si ocurre en un solo lado del cuerpo. ¨ Cambios repentinos en la visión. ¨ Dificultades repentinas para hablar. ¨ Confusión repentina o dificultad para comprender frases sencillas. ¨ Problemas repentinos para caminar o mantener el equilibrio. ¨ Dolor de [place] intenso y repentino, distinto de los yeison de rosana anteriores. ?Llame a carter médico ahora mismo o busque atención médica inmediata si:  ? · Tiene náuseas y vómito nuevos o [facility] que ya tenía. ? · Tiene fiebre nueva o más ander. ? · Carter dolor [facility]ana [facility]. ?Preste especial atención a los cambios en carter dylon y asegúrese de comunicarse con carter médico si:  ? · No mejora después de 2 días (48 horas). Dónde puede encontrar más información en inglés? Anabel Harman a http://irene-danny.info/. Escriba C544 en la búsqueda para aprender más acerca de \"Dolor de rosana por tensión: Instrucciones de cuidado - [ Tension Headache: Care Instructions ]. \"  Revisado: 14 octubre, 2016  Versión del contenido: 11.4  © 9905-1045 Healthwise, Incorporated. Las instrucciones de cuidado fueron adaptadas bajo licencia por Good Help Connections (which disclaims liability or warranty for this information). Si usted tiene [place] afección médica o sobre estas instrucciones, siempre pregunte a carter profesional de dylon. [facility]orporated niega toda garantía o responsabilidad por carter uso de esta información.

## 2018-03-15 NOTE — PROGRESS NOTES
Vladimir Greenfield is a 27 y.o. female    Issues discussed today include:    Chief Complaint   Patient presents with    Headache     x 5 days     1) HA:  HA in bilat temples. Describes as pulsing. Rates 5-6/10 at max. Yesterday had one episode of dizziness, but none prior or since. + photophobia. No phonophobia. + nausea, but no vomiting. Reports similar HAs in the past, but not often. 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 Takes lunch and break, thinks she eats enough. Drinks at least 10 small glasses of water daily at work. Denies new exposures or changes in housing, work, etc. Denies stress or anxiety. No vision changes or jaw pain. Has never had eye exam.    2) Urine problem:  Last week, 8-9 days ago she had pink urine for 3 days in a row. Then this resolved. Never had similar prior. Denies dysuria, frequency, abd or flank pain. Patient's last menstrual period was 03/02/2018. Data reviewed or ordered today:       Other problems include:  Patient Active Problem List   Diagnosis Code    Second degree hemorrhoids K64.1    Heart murmur, systolic M36.9       Medications:  Current Outpatient Prescriptions   Medication Sig Dispense Refill    aspirin-acetaminophen-caffeine (EXCEDRIN MIGRAINE) 250-250-65 mg per tablet Take 1 Tab by mouth every six (6) hours as needed for Headache. [place] 1 tableta marty vez cada 6 horas si nesecita para dolor de rosana 60 Tab 0    ketoconazole (NIZORAL) 2 % topical cream Apply  to affected area two (2) times a day. 30 g 0       Allergies:  No Known Allergies    LMP:  Patient's last menstrual period was 03/02/2018. Social History     Social History    Marital status: SINGLE     Spouse name: N/A    Number of children: N/A    Years of education: N/A     Occupational History    Not on file.      Social History Main Topics    Smoking status: Never Smoker    Smokeless tobacco: Never Used    Alcohol use No    Drug use: No    Sexual activity: Not on file     Other Topics Concern    Not on file     Social History Narrative       History reviewed. No pertinent family history. Physical Exam   Visit Vitals    /66    Pulse 64    Temp 98.2 °F (36.8 °C) (Oral)    Ht 4' 9.28\" (1.455 m)    Wt 120 lb (54.4 kg)    LMP 03/02/2018    BMI 25.71 kg/m2      BP Readings from Last 3 Encounters:   03/15/18 127/66   05/15/17 119/62   04/03/17 110/57     Constitutional: Appears well,  No acute distress, Vitals noted  Psychiatric:  Affect normal, Alert and Oriented to person/place/time  Eyes:  Conjunctiva clear, no drainage  ENT:  External ears and nose normal, Mucous membranes moist  Neck:  General inspection normal. Supple. Heart:  Normal HR, Normal S1 and S2,  Regular rhythm. 2/6 systolic murmur heard best at LUSB, no rubs or gallops. Lungs:  Clear to auscultation, good respiratory effort, no wheezes, rales or rhonchi  Abdomen: soft, nontender, no CVAT   Skin:  Warm to palpation, without rashes  Neuro: CNII-XII intact, symmetric and intact sensation to light touch throughout, equal and full motor strength in all extremities      Assessment/Plan:      ICD-10-CM ICD-9-CM    1. Acute non intractable tension-type headache G44.209 339.10 CBC WITH AUTOMATED DIFF      METABOLIC PANEL, BASIC   2. Pink-colored urine R82.99 791.9 URINALYSIS W/ RFLX MICROSCOPIC   3. Heart murmur, systolic M27.6 512.3      HA most c/w tension type HA. + photophobia and nausea make migraine possible, but no h/o same  AVS with information about tension headaches  Excedrin rx sent, can buy OTC if less $  Recommend seeing optometry, resource page given    Possible blood in urine from menses? Will check UA with micro. Murmur, new - pt hasn't had much food or drink today. Possible d/t mild hypovolemia, will observe and f/u.     Labs today as per above - will call if abnormal or she can call in a week to know results    Pt declined flu vaccine today      Follow-up Disposition:  Return if symptoms worsen or fail to improve.         Samara Byrd MD  [address] [place] [address], Elaina Celayakinson

## 2018-03-15 NOTE — MR AVS SNAPSHOT
[address] [address] [address] [facility] 57 
[phone] Patient: Jamar Zhang MRN: [id number] Visit Information Jenna De La Torre Personal Médico Departamento Teléfono del Dep. Número de visita 3/15/2018 11:30 AM [place] MD [address] [phone] Follow-up Instructions Return if symptoms worsen or fail to improve. Upcoming Health Maintenance Date Due DTaP/Tdap/Td series (1 - Tdap) 2004 PAP AKA CERVICAL CYTOLOGY 2004 Influenza Age 5 to Adult 2017 Alergias  Review Complete El: 3/15/2018 Por: Humble Cerda LPN A partir del:  3/15/2018 No Known Allergies Vacunas actuales Goldie Gonsalves Burner Influenza Vaccine PF 2013 No revisadas esta visita You Were Diagnosed With   
  
 Mira Fraction Acute non intractable tension-type headache    -  Primary ICD-10-CM: Z57.430 ICD-9-CM: 339.10 Pink-colored urine     ICD-10-CM: R82.99 
ICD-9-CM: 791.9 Partes vitales PS Pulso Temperatura [place] ( percentil de crecimiento) Peso (percentil de crecimiento) LMP (última celeste) 127/66 64 98.2 °F (36.8 °C) (Oral) 4' 9.28\" (1.455 m) 120 lb (54.4 kg) 2018 BMI [facility]) Estado obstétrico Estatus de tabaquísmo 25.71 kg/m2 Having regular periods Never Smoker Historial de signos vitales BMI and BSA Data Body Mass Index Body Surface Area 25.71 kg/m 2 1.48 m 2 [facility] Name Phone  [facility], [address] [place] [address] [address] [facility] [phone] Harvey lista de medicamentos actualizada Lorenda Leventhal actualizada 3/15/18  2:33 PM.  Sofie Laming use harvey lista de medicamentos más reciente. aspirin-acetaminophen-caffeine 250-250-65 mg per tablet También conocido miladis:  EXCEDRIN MIGRAINE Take 1 Tab by mouth every six (6) hours as needed for Headache. [place] 1 tableta marty vez cada 6 horas si nesecita para dolor de rosana  
  
 ketoconazole 2 % topical cream  
También conocido miladis:  NIZORAL Apply  to affected area two (2) times a day. Recetas Enviado a la Mary Refills  
 aspirin-acetaminophen-caffeine (EXCEDRIN MIGRAINE) 250-250-65 mg per tablet 0 Sig: Take 1 Tab by mouth every six (6) hours as needed for Headache. [place] 1 tableta marty vez cada 6 horas si nesecita para dolor de [place] Class: Normal  
 Pharmacy: [facility] [address], [address] [place] [address] #: [phone] Route: Oral  
  
Instrucciones de seguimiento Return if symptoms worsen or fail to improve. Instrucciones para el Paciente Dolor de rosana por tensión: Instrucciones de cuidado - [ Tension Headache: Care Instructions ] Instrucciones de cuidado La mayoría de los yeison de [place] son por tensión. Estos yeison de [place] tienden a repetirse, en especial si usted está bajo estrés. Un dolor de rosana por tensión podría causar dolor o marty sensación de presión en toda la rosana. Es probable que no pueda determinar con precisión el centro del dolor. Si sigue teniendo yeison de rosana por tensión, lo mejor que puede hacer para limitarlos es determinar qué los causa y hacer cambios en esas áreas. La atención de seguimiento es marty parte clave de carter tratamiento y seguridad. Asegúrese de hacer y acudir a todas las citas, y llame a carter médico si está teniendo problemas. También es marty buena idea saber los resultados de los exámenes y mantener marty lista de los medicamentos que marcie. Cómo puede cuidarse en el hogar? · Descanse en un cuarto tranquilo y oscuro con un paño frío sobre la frente hasta que desaparezca el dolor de [place].  Cierre los ojos y trate de relajarse o dormirse. No linda televisión ni rj. Evite usar la computadora. · Utilice marty toalla húmeda tibia o marty almohadilla térmica ajustada a baja temperatura para relajar los músculos rígidos del lorelei y los hombros. 
· [place] a alguien que le fabiana masajes suaves en el lorelei y los hombros. 
· [place] los analgésicos (medicamentos para el dolor) exactamente según las indicaciones. ¨ Si el médico le recetó un analgésico, tómelo según las indicaciones. ¨ Si no está tomando un analgésico recetado, pregúntele a carter médico si puede kirill lizzy de [employer]. · Tenga cuidado de no kirill analgésicos con mayor frecuencia que la permitida en las indicaciones porque los yeison de rosana podrían empeorar o aparecer con mayor frecuencia marty vez que el medicamento pierda carter [place]. · Si le da otro dolor de ros[place] por tensión, deje de hacer lo que esté haciendo y siéntese tranquilo demetria un momento. Cierre los ojos y respire lentamente. Trate de [employer] de la rosana y del lorelei. · Preste atención a los nuevos síntomas que aparecen con el dolor de Timothy Barr, debilidad o entumecimiento, cambios en la visión o confusión. Podrían ser señales de un problema más grave. Para ayudar a prevenir los yeison de [place] · Lleve un diario de derian yeison de rosana para poder averiguar qué los produce. Evitar los desencadenantes podría ayudar a prevenir los yeison de [place]. Anote cuándo empieza cada dolor de [place], cuánto dura y cómo es el dolor (palpitante, sigifredo, punzante o sordo). Ponga en la lista cualquier cosa que pudiera shannon desencadenado el dolor de ro[place], rosemarie miladis estrés físico o emocional o estar ansioso o deprimido. Otros desencadenantes posibles son Erika Mccullough, la earline, la fatiga, Jess More y la distensión muscular. · Encuentre maneras saludables de [employer] [place] [employer]. Los yeison de [place] son más comunes demetria o shereen después de un momento estresante.  Tómese un tiempo para relajarse antes y después de hacer algo que le haya causado un dolor de rosana en el pasado. · Venus ejercicio a diario para aliviar el estrés. Hacer ejercicios de relajación podría ayudarle a reducir la tensión. · Duerma lo suficiente. · Coma con regularidad y earnest. Largos períodos sin comer pueden provocar un dolor de rosana. · Regálese un masaje. Algunas personas encuentran que los masajes son Reggy Michael para aliviar la tensión. · Trate de mantener derian músculos relajados mediante marty buena postura. Revise si tiene [place] [employer] de la [place], la cedric, el lorelei y los hombros y trate de relajarlos. Cuando se siente en un escritorio, cambie de posición con frecuencia y estírese por 27 segundos cada hora. · Reduzca la fatiga ocular a causa de la computadora parpadeando con frecuencia y apartando la mirada de la pantalla a menudo. Asegúrese de tener lentes adecuados y de que carter monitor esté colocado de manera correcta, miladis a un brazo de distancia. Cuándo debe pedir ayuda? Llame al 911 en cualquier momento que piense que puede necesitar atención de [place]. Por ejemplo, llame si: 
? · Tiene señales de un ataque cerebral. Estas pueden incluir: 
¨ Entumecimiento, parálisis o debilidad repentinos en la cedric, el brazo o la pierna, sobre todo si ocurre en un solo lado del cuerpo. ¨ Cambios repentinos en la visión. ¨ Dificultades repentinas para hablar. ¨ Confusión repentina o dificultad para comprender frases sencillas. ¨ Problemas repentinos para caminar o mantener el equilibrio. ¨ Dolor de [place] intenso y repentino, distinto de los yeison de rosana anteriores. ?Llame a carter médico ahora mismo o busque atención médica inmediata si: 
? · Tiene náuseas y vómito nuevos o [facility] que ya tenía. ? · Tiene fiebre nueva o más ander. ? · Carter dolor de rosana [facility]. ?Preste especial atención a los cambios en carter dylon y asegúrese de comunicarse con carter médico si: 
 ? · No mejora después de 2 días (48 horas). Dónde puede encontrar más información en inglés? Hope Dolan a http://irene-danny.info/. Escriba C544 en la búsqueda para aprender más acerca de \"Dolor de rosana por tensión: Instrucciones de cuidado - [ Tension Headache: Care Instructions ]. \" 
Revisado: 2016 Versión del contenido: 11.4 © 3073-5961 Healthwise, Incorporated. Las instrucciones de cuidado fueron adaptadas bajo licencia por Good [employer] (which disclaims liability or warranty for this information). Si usted tiene [place] afección médica o sobre estas instrucciones, siempre pregunte a carter profesional de dylon. Healthwise, Incorporated niega toda garantía o responsabilidad por carter uso de esta información. Introducing [facility]! [facility] introduce portal paciente MyChart . Ahora se puede acceder a partes de carter expediente médico, enviar por correo electrónico la oficina de carter médico y solicitar renovaciones de medicamentos en línea. En carter navegador de Internet , Tasha Manzanares a https://[employer]. [employer]. Rest[employer] Fabiana clic en el usuario por Ovidio Aleks? Slick blueic aquí en la sesión [facility]. Verá la página de registro [place]. Ingrese carter código de [employer] Elena rosemarie y miladis aparece a continuación. Usted no tendrá que [employer] código después de shannon completado el proceso de registro . Si usted no se inscribe antes de la fecha de caducidad , debe solicitar un nuevo código. · MyChart Código de acceso : [id number] Expires: 2018  2:00 PM 
 
Ingresa los últimos cuatro dígitos de carter Número de Seguro Social ( xxxx ) y fecha de nacimiento ( dd / mm / aaaa ) miladis se indica y fabiana clic en Enviar. Usted será llevado a la siguiente página de registro . Crear un ID MyChart . Esta será carter ID de inicio de sesión de MyChart y no puede ser [place] , por lo que pensar en marty que es [place] y fácil de recordar . Crear marty contraseña MyChart . Usted puede cambiar carter contraseña en cualquier momento . Ingrese carter Password Reset de preguntas y [place] . [place] se puede utilizar en un momento posterior si usted olvida carter contraseña. Introduzca carter dirección de correo electrónico . Kacy Howard recibirá marty notificación por correo electrónico cuando la nueva información está disponible en MyChart . Amelie Garciae clic en Registrarse. Miguel Berry sonny y descargar porciones de carter expediente médico. 
[place] clic en el enlace de descarga del menú Resumen para descargar marty copia portátil de carter información médica . Si tiene [employer] , por favor visite la sección de preguntas frecuentes del sitio web MyChart . Recuerde, MyChart NO es que se utilizará para las necesidades urgentes. Para emergencias médicas , llame al 911 . Ahora disponible en carter iPhone y Android ! Por favor proporcione sapphire resumen de la documentación de cuidado a carter próximo proveedor. Your primary care clinician is listed as NONE. If you have any questions after today's visit, please call [phone].

## 2018-03-15 NOTE — PROGRESS NOTES
Statements below were documented by Jonas Portillo [facility]  for this patient visit was Lizy Carias. Patient discharged home with AVS and Medication teaching . No further questions. Reviewed patient's medications, how to take, where to pickup and if any refills, patient verbalized understanding and has no questions. Vision resources given in [address] [facility]. Verbalized understanding of information discussed at discharge.  Jonas Portillo RN

## 2018-03-16 DIAGNOSIS — R31.9 HEMATURIA, UNSPECIFIED TYPE: Primary | ICD-10-CM

## 2018-03-16 PROBLEM — R01.1 HEART MURMUR, SYSTOLIC: Status: ACTIVE | Noted: 2018-03-16

## 2018-03-16 LAB
APPEARANCE UR: CLEAR
BACTERIA URNS QL MICRO: NEGATIVE /HPF
BILIRUB UR QL: NEGATIVE
CK SERPL-CCNC: 105 U/L (ref 26–192)
COLOR UR: ABNORMAL
EPITH CASTS URNS QL MICRO: ABNORMAL /LPF
GLUCOSE UR STRIP.AUTO-MCNC: NEGATIVE MG/DL
HGB UR QL STRIP: ABNORMAL
KETONES UR QL STRIP.AUTO: NEGATIVE MG/DL
LEUKOCYTE ESTERASE UR QL STRIP.AUTO: ABNORMAL
NITRITE UR QL STRIP.AUTO: NEGATIVE
PH UR STRIP: 6.5 [PH] (ref 5–8)
PROT UR STRIP-MCNC: NEGATIVE MG/DL
RBC #/AREA URNS HPF: ABNORMAL /HPF (ref 0–5)
SP GR UR REFRACTOMETRY: 1.01 (ref 1–1.03)
UROBILINOGEN UR QL STRIP.AUTO: 0.2 EU/DL (ref 0.2–1)
WBC URNS QL MICRO: ABNORMAL /HPF (ref 0–4)

## 2018-03-16 RX ORDER — LANOLIN ALCOHOL/MO/W.PET/CERES
325 CREAM (GRAM) TOPICAL 2 TIMES DAILY WITH MEALS
Qty: 60 TAB | Refills: 2 | Status: SHIPPED | OUTPATIENT
Start: 2018-03-16

## 2018-03-16 NOTE — PROGRESS NOTES
Nurse to please call and inform patient of the following (** represent recommendations for patient) - thank you:    CBC - notable for mild microcytic anemia   ** Recommend starting ferrous sulfate 325mg bid (rx sent). Take with food to lessen side effects, but avoid taking within 1-2 hrs of having milk products bc lessens absorption. ** Will plan to recheck Hgb at follow up in 3 months. UA with \"large blood\" but 0-5 RBCs, ?myoglobinuria. I inquired about adding on CK to r/o rhabdomyolysis given h/o pink urine last week. If lab not able to be added, will plan to check POC ua at follow up as well.   ** RTC if bloody urine occurs again    BMP wnl, no kidney, sugar or electrolyte problems identified

## 2018-03-19 NOTE — PROGRESS NOTES
I called pt today with Hunie  # [phone] and gave message from provider. Pt will get RX and return in 3 months for follow up. Diann Perry RN

## 2018-09-06 ENCOUNTER — HOSPITAL ENCOUNTER (OUTPATIENT)
Dept: LAB | Age: 33
Discharge: HOME | End: 2018-09-06

## 2018-09-06 ENCOUNTER — LAB ONLY (OUTPATIENT)
Dept: FAMILY MEDICINE CLINIC | Age: 33
End: 2018-09-06

## 2018-09-06 DIAGNOSIS — R31.9 HEMATURIA, UNSPECIFIED TYPE: Primary | ICD-10-CM

## 2018-09-06 DIAGNOSIS — R31.9 HEMATURIA, UNSPECIFIED TYPE: ICD-10-CM

## 2018-09-06 DIAGNOSIS — D50.9 MICROCYTIC ANEMIA: ICD-10-CM

## 2018-09-06 LAB
APPEARANCE UR: ABNORMAL
BACTERIA URNS QL MICRO: NEGATIVE /HPF
BASOPHILS # BLD: 0 K/UL (ref 0–0.1)
BASOPHILS NFR BLD: 0 % (ref 0–1)
BILIRUB UR QL: NEGATIVE
COLOR UR: ABNORMAL
DIFFERENTIAL METHOD BLD: NORMAL
EOSINOPHIL # BLD: 0.2 K/UL (ref 0–0.4)
EOSINOPHIL NFR BLD: 3 % (ref 0–7)
EPITH CASTS URNS QL MICRO: ABNORMAL /LPF
ERYTHROCYTE [DISTWIDTH] IN BLOOD BY AUTOMATED COUNT: 14.5 % (ref 11.5–14.5)
GLUCOSE UR STRIP.AUTO-MCNC: NEGATIVE MG/DL
HCT VFR BLD AUTO: 38.2 % (ref 35–47)
HGB BLD-MCNC: 11.9 G/DL (ref 11.5–16)
HGB UR QL STRIP: ABNORMAL
IMM GRANULOCYTES # BLD: 0 K/UL (ref 0–0.04)
IMM GRANULOCYTES NFR BLD AUTO: 0 % (ref 0–0.5)
KETONES UR QL STRIP.AUTO: NEGATIVE MG/DL
LEUKOCYTE ESTERASE UR QL STRIP.AUTO: ABNORMAL
LYMPHOCYTES # BLD: 2.3 K/UL (ref 0.8–3.5)
LYMPHOCYTES NFR BLD: 41 % (ref 12–49)
MCH RBC QN AUTO: 28.1 PG (ref 26–34)
MCHC RBC AUTO-ENTMCNC: 31.2 G/DL (ref 30–36.5)
MCV RBC AUTO: 90.1 FL (ref 80–99)
MONOCYTES # BLD: 0.4 K/UL (ref 0–1)
MONOCYTES NFR BLD: 7 % (ref 5–13)
MUCOUS THREADS URNS QL MICRO: ABNORMAL /LPF
NEUTS SEG # BLD: 2.7 K/UL (ref 1.8–8)
NEUTS SEG NFR BLD: 48 % (ref 32–75)
NITRITE UR QL STRIP.AUTO: NEGATIVE
NRBC # BLD: 0 K/UL (ref 0–0.01)
NRBC BLD-RTO: 0 PER 100 WBC
PH UR STRIP: 6.5 [PH] (ref 5–8)
PLATELET # BLD AUTO: 318 K/UL (ref 150–400)
PMV BLD AUTO: 11.1 FL (ref 8.9–12.9)
PROT UR STRIP-MCNC: NEGATIVE MG/DL
RBC # BLD AUTO: 4.24 M/UL (ref 3.8–5.2)
RBC #/AREA URNS HPF: ABNORMAL /HPF (ref 0–5)
SP GR UR REFRACTOMETRY: 1.02 (ref 1–1.03)
UROBILINOGEN UR QL STRIP.AUTO: 0.2 EU/DL (ref 0.2–1)
WBC # BLD AUTO: 5.6 K/UL (ref 3.6–11)
WBC URNS QL MICRO: ABNORMAL /HPF (ref 0–4)

## 2018-09-06 PROCEDURE — 81001 URINALYSIS AUTO W/SCOPE: CPT | Performed by: FAMILY MEDICINE

## 2018-09-06 PROCEDURE — 85025 COMPLETE CBC W/AUTO DIFF WBC: CPT | Performed by: FAMILY MEDICINE

## 2018-09-06 NOTE — PROGRESS NOTES
Patient presents for lab draw ordered by:ASHLEY King    The following labs were drawn Baby Ubaldo, LPN    CBC and U/A    The following tubes were sent:  Yunior Jose  ( 1)& urine

## 2018-09-10 NOTE — PROGRESS NOTES
UA micro reveals persistent large blood in urine, but no sign of infection. ** Nurse to pls call pt and confirm that she was NOT on her menstrual period when she came for labs an gave urine sample on 9/6  ** If she was not menstruating, I recommend pt see urology for persistent large hematuria and I can place referral via AN. If she was menstruating, pls have pt come back to give UA when between her cycles. Thanks.     Anemia has resolved with iron therapy, can scale back to ferrous sulfate 325mg once daily (previously twice daily)

## 2018-10-04 NOTE — PROGRESS NOTES
Left a generic message for patient today asking the patient to call back to speak with a nurse for message from provider. Also, I mailed pt labs and same message to call us back to speak with a nurse.   Yeison Sinclair RN

## 2018-10-16 ENCOUNTER — HOSPITAL ENCOUNTER (OUTPATIENT)
Dept: LAB | Age: 33
Discharge: HOME | End: 2018-10-16

## 2018-10-16 ENCOUNTER — CLINICAL SUPPORT (OUTPATIENT)
Dept: FAMILY MEDICINE CLINIC | Age: 33
End: 2018-10-16

## 2018-10-16 DIAGNOSIS — R31.9 HEMATURIA, UNSPECIFIED TYPE: Primary | ICD-10-CM

## 2018-10-16 DIAGNOSIS — Z71.9 COUNSELED BY NURSE: ICD-10-CM

## 2018-10-16 LAB
APPEARANCE UR: ABNORMAL
BACTERIA URNS QL MICRO: ABNORMAL /HPF
BILIRUB UR QL: NEGATIVE
COLOR UR: ABNORMAL
EPITH CASTS URNS QL MICRO: ABNORMAL /LPF
GLUCOSE UR STRIP.AUTO-MCNC: NEGATIVE MG/DL
HGB UR QL STRIP: ABNORMAL
HYALINE CASTS URNS QL MICRO: ABNORMAL /LPF (ref 0–5)
KETONES UR QL STRIP.AUTO: NEGATIVE MG/DL
LEUKOCYTE ESTERASE UR QL STRIP.AUTO: ABNORMAL
NITRITE UR QL STRIP.AUTO: NEGATIVE
PH UR STRIP: 7 [PH] (ref 5–8)
PROT UR STRIP-MCNC: NEGATIVE MG/DL
RBC #/AREA URNS HPF: ABNORMAL /HPF (ref 0–5)
SP GR UR REFRACTOMETRY: 1.01 (ref 1–1.03)
UROBILINOGEN UR QL STRIP.AUTO: 0.2 EU/DL (ref 0.2–1)
WBC URNS QL MICRO: ABNORMAL /HPF (ref 0–4)

## 2018-10-16 PROCEDURE — 81001 URINALYSIS AUTO W/SCOPE: CPT | Performed by: FAMILY MEDICINE

## 2018-10-16 NOTE — PROGRESS NOTES
Patient comes in today to get lab results. Chart reviewed and message form Dr. Disha Lazo on reviewed labs from 9/6/18 was relayed to the patient. Patient verbalizes understanding, is aware to go back to taking ferrous sulfate 325mg tablet once a day. Patient states that she has 10year IUD, has been with IUD for about 7 years and states after 3 years with the IUD she stated to have spotting in between periods. LMP was documented and patient states that back in September she does not recall being in her period (per patient her period was 08/26/18-8/31/18) but does state that she might have been spotting on and off. Patient states that she is NOT on her menses or spotting at this time. Discussed patient's information with Dr. Disha Lazo who ordered a UA with micro and if positive for blood she will referred patient to Urology. Instructed patient to go to the Lab area, and if abnormal results we will call her and the nurse will give further instructions (patient has had access now in  The past, not active at this time). Patient verbalized understanding and agrees with plan.  Lanie Merchant RN

## 2018-10-17 DIAGNOSIS — R82.90 ABNORMAL URINALYSIS: Primary | ICD-10-CM

## 2018-10-17 NOTE — PROGRESS NOTES
UA now with small blood, 0-5 RBCs on micro - will observe for now, even when large blood there were few RBCs on micro - may be myoglobin  This time with large LE and 1+ bacteria, will try to add on UCx - email inquiry sent to lab contacts

## 2018-10-18 NOTE — PROGRESS NOTES
They could not add on UCx bc correct tube was not sent (bc I didn't order it from clinic, of course)  Nurse to pls call pt and ensure she is not having dysuria and if so I'll send abx empirically without culture

## 2018-11-09 NOTE — PROGRESS NOTES
Int # [id number]. Tc to the pt. The pt was asked how she was doing, and if she was having any dysuria or any urinary sx's at all. The pt stated that no she was not. The pt stated she was doing well at this time. Pt was given the message that the provider would not be ordering any medications at this time if she was doing well and not having sx. The pt also was told, if she did start having sx she could call or come back to the clinic to see the provider. Pt verbalized understanding and denied further questions.  Heather Michel RN

## 2019-02-07 ENCOUNTER — OFFICE VISIT (OUTPATIENT)
Dept: FAMILY MEDICINE CLINIC | Age: 34
End: 2019-02-07

## 2019-02-07 ENCOUNTER — HOSPITAL ENCOUNTER (OUTPATIENT)
Dept: LAB | Age: 34
Discharge: HOME | End: 2019-02-07
Payer: SUBSIDIZED

## 2019-02-07 VITALS
DIASTOLIC BLOOD PRESSURE: 60 MMHG | WEIGHT: 130.4 LBS | SYSTOLIC BLOOD PRESSURE: 111 MMHG | HEIGHT: 58 IN | HEART RATE: 68 BPM | TEMPERATURE: 98.4 F | BODY MASS INDEX: 27.37 KG/M2

## 2019-02-07 DIAGNOSIS — R10.9 RIGHT FLANK PAIN: ICD-10-CM

## 2019-02-07 DIAGNOSIS — R31.0 GROSS HEMATURIA: Primary | ICD-10-CM

## 2019-02-07 LAB
BILIRUB UR QL STRIP: NEGATIVE
GLUCOSE UR-MCNC: NEGATIVE MG/DL
HCG URINE, QL. (POC): NEGATIVE
KETONES P FAST UR STRIP-MCNC: NEGATIVE MG/DL
PH UR STRIP: 6 [PH] (ref 4.6–8)
PROT UR QL STRIP: NEGATIVE
SP GR UR STRIP: 1.02 (ref 1–1.03)
UA UROBILINOGEN AMB POC: NORMAL (ref 0.2–1)
URINALYSIS CLARITY POC: NORMAL
URINALYSIS COLOR POC: YELLOW
URINE BLOOD POC: NORMAL
URINE LEUKOCYTES POC: NORMAL
URINE NITRITES POC: NEGATIVE
VALID INTERNAL CONTROL?: YES

## 2019-02-07 PROCEDURE — 87086 URINE CULTURE/COLONY COUNT: CPT

## 2019-02-07 RX ORDER — CIPROFLOXACIN 500 MG/1
500 TABLET ORAL 2 TIMES DAILY
Qty: 10 TAB | Refills: 0 | Status: SHIPPED | OUTPATIENT
Start: 2019-02-07 | End: 2019-02-12

## 2019-02-07 NOTE — PROGRESS NOTES
HISTORY OF PRESENT ILLNESS Ebonie Jenkins is a 26 y.o. female. HPI Patient states for the past 2 months she has had right sided abdominal pain,  It is worse at nighttime. It is a beltlike distribution of the pain. 2 years ago she had a kidney check and was normal. 
It is worse at night. Denies nausea, no vomiting. States every time she drinks water needs to go urinate Review of Systems Respiratory: Negative for cough, shortness of breath and wheezing. Cardiovascular: Negative for chest pain and palpitations. Gastrointestinal: Negative for abdominal pain, heartburn, nausea and vomiting. Genitourinary: Positive for flank pain and hematuria. /60 (BP 1 Location: Left arm, BP Patient Position: Sitting)   Pulse 68   Temp 98.4 °F (36.9 °C) (Oral)   Ht 4' 9.68\" (1.465 m)   Wt 130 lb 6.4 oz (59.1 kg)   LMP 01/12/2019   BMI 27.56 kg/m² Physical Exam  
Constitutional: She is oriented to person, place, and time. She appears well-developed and well-nourished. HENT:  
Head: Normocephalic. Right Ear: External ear normal.  
Left Ear: External ear normal.  
Eyes: Conjunctivae and EOM are normal. Pupils are equal, round, and reactive to light. Neck: Normal range of motion. Neck supple. Cardiovascular: Normal rate, regular rhythm and normal heart sounds. Pulmonary/Chest: Effort normal. No respiratory distress. She has no wheezes. She has no rales. Abdominal: Soft. Bowel sounds are normal. There is tenderness. Right flank pain Musculoskeletal: Normal range of motion. She exhibits no edema. Neurological: She is alert and oriented to person, place, and time. She has normal reflexes. No cranial nerve deficit. ASSESSMENT and PLAN Diagnoses and all orders for this visit: 1. Gross hematuria -     AMB POC URINALYSIS DIP STICK MANUAL W/O MICRO 
-     AMB POC URINE PREGNANCY TEST, VISUAL COLOR COMPARISON 
-     CULTURE, URINE; Future -     ciprofloxacin HCl (CIPRO) 500 mg tablet; Take 1 Tab by mouth two (2) times a day for 5 days. 
-     US RETROPERITONEUM COMP; Future 2. Right flank pain 
-     US RETROPERITONEUM COMP; Future 26year old with episodes of gross hematuria and right flank pain,  Ultrasound had been ordered in the past but not performed. Will get retroperitoneum ultrasound. UA shows blood, will send urine culture, treat with cipro x 5 days

## 2019-02-07 NOTE — PROGRESS NOTES
At discharge station AVS was printed and reviewed with pt with [facility] AMANDA as . Pt given Good RX  today for cipro. Scheduled pt for ultrasound test and discussed prep directions with pt. She has appointment on 2/12/19 at [facility] to arrive at 7 for 7:30 ultrasound. Told pt to ask about the care card which is our financial assistance program.  Also told pt that they will be required to do a financial screening  Also told them that if they get a bill before they get their card to call the phone # on the bill to let them know they have applied for the Care Card. Gave pt financial assistance application and highlighted for them the [employer] assistance phone number for them as well. Amaya Bravo RN . '

## 2019-02-07 NOTE — PROGRESS NOTES
Coordination of Care 1. Have you been to the ER, urgent care clinic since your last visit? Hospitalized since your last visit? No 
 
2. Have you seen or consulted any other health care providers outside of the [address] [place] [address] since your last visit? Include any pap smears or colon screening. No 
 
Does the patient need refills? NO Learning Assessment Complete? yes Results for orders placed or performed in visit on 02/07/19 AMB POC URINALYSIS DIP STICK MANUAL W/O MICRO Result Value Ref Range Color (UA POC) Yellow Clarity (UA POC) Cloudy Glucose (UA POC) Negative Negative Bilirubin (UA POC) Negative Negative Ketones (UA POC) Negative Negative Specific gravity (UA POC) 1.025 1.001 - 1.035 Blood (UA POC) 1+ Negative pH (UA POC) 6 4.6 - 8.0 Protein (UA POC) Negative Negative Urobilinogen (UA POC) 0.2 mg/dL 0.2 - 1 Nitrites (UA POC) Negative Negative Leukocyte esterase (UA POC) 3+ Negative AMB POC URINE PREGNANCY TEST, VISUAL COLOR COMPARISON Result Value Ref Range VALID INTERNAL CONTROL POC Yes HCG urine, Ql. (POC) Negative Negative

## 2019-02-09 LAB
BACTERIA SPEC CULT: NORMAL
CC UR VC: NORMAL
SERVICE CMNT-IMP: NORMAL

## 2019-02-12 ENCOUNTER — HOSPITAL ENCOUNTER (OUTPATIENT)
Dept: ULTRASOUND IMAGING | Age: 34
Discharge: HOME | End: 2019-02-12
Attending: FAMILY MEDICINE
Payer: SUBSIDIZED

## 2019-02-12 DIAGNOSIS — R10.9 RIGHT FLANK PAIN: ICD-10-CM

## 2019-02-12 DIAGNOSIS — R31.0 GROSS HEMATURIA: ICD-10-CM

## 2019-02-12 PROCEDURE — 76770 US EXAM ABDO BACK WALL COMP: CPT

## 2019-02-28 ENCOUNTER — OFFICE VISIT (OUTPATIENT)
Dept: FAMILY MEDICINE CLINIC | Age: 34
End: 2019-02-28

## 2019-02-28 VITALS
DIASTOLIC BLOOD PRESSURE: 63 MMHG | SYSTOLIC BLOOD PRESSURE: 117 MMHG | HEART RATE: 68 BPM | TEMPERATURE: 98.6 F | WEIGHT: 129 LBS | BODY MASS INDEX: 27.26 KG/M2

## 2019-02-28 DIAGNOSIS — K52.9 COLITIS: Primary | ICD-10-CM

## 2019-02-28 RX ORDER — METRONIDAZOLE 500 MG/1
500 TABLET ORAL 3 TIMES DAILY
Qty: 15 TAB | Refills: 0 | Status: SHIPPED | OUTPATIENT
Start: 2019-02-28 | End: 2019-03-05

## 2019-02-28 NOTE — PROGRESS NOTES
HISTORY OF PRESENT ILLNESS Paola Vilchis is a 26 y.o. female. HPI Patient states her urinary tract infection improved. Now is having abdominal pains , worse after eating,  Has loose stools. In 1 day has 3 episodes of loose stools. She also had experienced nausea. No fever Review of Systems Respiratory: Negative for cough, shortness of breath and wheezing. Cardiovascular: Negative for chest pain and palpitations. Gastrointestinal: Positive for abdominal pain and diarrhea. Negative for heartburn, nausea and vomiting. Genitourinary: Negative for flank pain and hematuria. /63 (BP 1 Location: Right arm, BP Patient Position: Sitting)   Pulse 68   Temp 98.6 °F (37 °C) (Oral)   Wt 129 lb (58.5 kg)   LMP 02/16/2019   BMI 27.26 kg/m² Physical Exam  
Constitutional: She is oriented to person, place, and time. She appears well-developed and well-nourished. HENT:  
Head: Normocephalic. Right Ear: External ear normal.  
Left Ear: External ear normal.  
Eyes: Conjunctivae and EOM are normal. Pupils are equal, round, and reactive to light. Neck: Normal range of motion. Neck supple. Cardiovascular: Normal rate, regular rhythm and normal heart sounds. Pulmonary/Chest: Effort normal. No respiratory distress. She has no wheezes. She has no rales. Abdominal: Soft. Bowel sounds are normal. There is no tenderness. Musculoskeletal: Normal range of motion. She exhibits no edema. Neurological: She is alert and oriented to person, place, and time. She has normal reflexes. No cranial nerve deficit. ASSESSMENT and PLAN Diagnoses and all orders for this visit: 1. Colitis 
-     metroNIDAZOLE (FLAGYL) 500 mg tablet; Take 1 Tab by mouth three (3) times daily for 5 days.

## 2019-02-28 NOTE — PROGRESS NOTES
Coordination of Care 1. Have you been to the ER, urgent care clinic since your last visit? Hospitalized since your last visit? No 
 
2. Have you seen or consulted any other health care providers outside of the [facility] since your last visit? Include any pap smears or colon screening. No 
 
Does the patient need refills? YES Learning Assessment Complete? yes

## 2019-12-23 NOTE — PROGRESS NOTES
Patient came to get lab results to clinic today. Messaged was relayed to the patient. Please see nurse visit encounter for further details.  Ramon Prasad RN O-T Plasty Text: The defect edges were debeveled with a #15 scalpel blade.  Given the location of the defect, shape of the defect and the proximity to free margins an O-T plasty was deemed most appropriate.  Using a sterile surgical marker, an appropriate O-T plasty was drawn incorporating the defect and placing the expected incisions within the relaxed skin tension lines where possible.    The area thus outlined was incised deep to adipose tissue with a #15 scalpel blade.  The skin margins were undermined to an appropriate distance in all directions utilizing iris scissors.

## 2022-03-18 PROBLEM — K64.1 SECOND DEGREE HEMORRHOIDS: Status: ACTIVE | Noted: 2017-04-03

## 2022-03-19 PROBLEM — R01.1 HEART MURMUR, SYSTOLIC: Status: ACTIVE | Noted: 2018-03-16

## 2023-04-19 ENCOUNTER — APPOINTMENT (OUTPATIENT)
Dept: GENERAL RADIOLOGY | Age: 38
End: 2023-04-19
Attending: EMERGENCY MEDICINE

## 2023-04-19 ENCOUNTER — HOSPITAL ENCOUNTER (EMERGENCY)
Age: 38
Discharge: HOME | End: 2023-04-19
Attending: EMERGENCY MEDICINE

## 2023-04-19 ENCOUNTER — APPOINTMENT (OUTPATIENT)
Dept: CT IMAGING | Age: 38
End: 2023-04-19
Attending: EMERGENCY MEDICINE

## 2023-04-19 VITALS
WEIGHT: 140 LBS | DIASTOLIC BLOOD PRESSURE: 67 MMHG | HEART RATE: 80 BPM | RESPIRATION RATE: 18 BRPM | OXYGEN SATURATION: 99 % | HEIGHT: 55 IN | SYSTOLIC BLOOD PRESSURE: 113 MMHG | BODY MASS INDEX: 32.4 KG/M2 | TEMPERATURE: 98.2 F

## 2023-04-19 DIAGNOSIS — R07.9 CHEST PAIN, UNSPECIFIED TYPE: Primary | ICD-10-CM

## 2023-04-19 LAB
ALBUMIN SERPL-MCNC: 4.2 G/DL (ref 3.5–5)
ALBUMIN/GLOB SERPL: 1 (ref 1.1–2.2)
ALP SERPL-CCNC: 70 U/L (ref 45–117)
ALT SERPL-CCNC: 23 U/L (ref 12–78)
ANION GAP SERPL CALC-SCNC: 2 MMOL/L (ref 5–15)
APPEARANCE UR: ABNORMAL
AST SERPL-CCNC: 11 U/L (ref 15–37)
BACTERIA URNS QL MICRO: ABNORMAL /HPF
BASOPHILS # BLD: 0 K/UL (ref 0–0.1)
BASOPHILS NFR BLD: 0 % (ref 0–1)
BILIRUB SERPL-MCNC: 0.4 MG/DL (ref 0.2–1)
BILIRUB UR QL: NEGATIVE
BUN SERPL-MCNC: 7 MG/DL (ref 6–20)
BUN/CREAT SERPL: 11 (ref 12–20)
CALCIUM SERPL-MCNC: 9.2 MG/DL (ref 8.5–10.1)
CHLORIDE SERPL-SCNC: 110 MMOL/L (ref 97–108)
CO2 SERPL-SCNC: 26 MMOL/L (ref 21–32)
COLOR UR: ABNORMAL
COMMENT, HOLDF: NORMAL
CREAT SERPL-MCNC: 0.63 MG/DL (ref 0.55–1.02)
DIFFERENTIAL METHOD BLD: NORMAL
EOSINOPHIL # BLD: 0.1 K/UL (ref 0–0.4)
EOSINOPHIL NFR BLD: 1 % (ref 0–7)
EPITH CASTS URNS QL MICRO: ABNORMAL /LPF
ERYTHROCYTE [DISTWIDTH] IN BLOOD BY AUTOMATED COUNT: 13.2 % (ref 11.5–14.5)
GLOBULIN SER CALC-MCNC: 4.3 G/DL (ref 2–4)
GLUCOSE SERPL-MCNC: 99 MG/DL (ref 65–100)
GLUCOSE UR STRIP.AUTO-MCNC: NEGATIVE MG/DL
HCG UR QL: NEGATIVE
HCT VFR BLD AUTO: 45.2 % (ref 35–47)
HGB BLD-MCNC: 15 G/DL (ref 11.5–16)
HGB UR QL STRIP: ABNORMAL
HYALINE CASTS URNS QL MICRO: ABNORMAL /LPF (ref 0–2)
IMM GRANULOCYTES # BLD AUTO: 0 K/UL (ref 0–0.04)
IMM GRANULOCYTES NFR BLD AUTO: 0 % (ref 0–0.5)
KETONES UR QL STRIP.AUTO: NEGATIVE MG/DL
LEUKOCYTE ESTERASE UR QL STRIP.AUTO: ABNORMAL
LIPASE SERPL-CCNC: 104 U/L (ref 73–393)
LYMPHOCYTES # BLD: 2.8 K/UL (ref 0.8–3.5)
LYMPHOCYTES NFR BLD: 39 % (ref 12–49)
MCH RBC QN AUTO: 29 PG (ref 26–34)
MCHC RBC AUTO-ENTMCNC: 33.2 G/DL (ref 30–36.5)
MCV RBC AUTO: 87.4 FL (ref 80–99)
MONOCYTES # BLD: 0.5 K/UL (ref 0–1)
MONOCYTES NFR BLD: 6 % (ref 5–13)
NEUTS SEG # BLD: 3.8 K/UL (ref 1.8–8)
NEUTS SEG NFR BLD: 54 % (ref 32–75)
NITRITE UR QL STRIP.AUTO: NEGATIVE
NRBC # BLD: 0 K/UL (ref 0–0.01)
NRBC BLD-RTO: 0 PER 100 WBC
PH UR STRIP: 6 (ref 5–8)
PLATELET # BLD AUTO: 283 K/UL (ref 150–400)
PMV BLD AUTO: 10.5 FL (ref 8.9–12.9)
POTASSIUM SERPL-SCNC: 3.5 MMOL/L (ref 3.5–5.1)
PROT SERPL-MCNC: 8.5 G/DL (ref 6.4–8.2)
PROT UR STRIP-MCNC: ABNORMAL MG/DL
RBC # BLD AUTO: 5.17 M/UL (ref 3.8–5.2)
RBC #/AREA URNS HPF: ABNORMAL /HPF (ref 0–5)
SAMPLES BEING HELD,HOLD: NORMAL
SODIUM SERPL-SCNC: 138 MMOL/L (ref 136–145)
SP GR UR REFRACTOMETRY: 1.01 (ref 1–1.03)
TROPONIN I SERPL HS-MCNC: <4 NG/L (ref 0–51)
UROBILINOGEN UR QL STRIP.AUTO: 1 EU/DL (ref 0.2–1)
WBC # BLD AUTO: 7.2 K/UL (ref 3.6–11)
WBC URNS QL MICRO: ABNORMAL /HPF (ref 0–4)

## 2023-04-19 PROCEDURE — 71046 X-RAY EXAM CHEST 2 VIEWS: CPT

## 2023-04-19 PROCEDURE — 84484 ASSAY OF TROPONIN QUANT: CPT

## 2023-04-19 PROCEDURE — 80053 COMPREHEN METABOLIC PANEL: CPT

## 2023-04-19 PROCEDURE — 83690 ASSAY OF LIPASE: CPT

## 2023-04-19 PROCEDURE — 99285 EMERGENCY DEPT VISIT HI MDM: CPT

## 2023-04-19 PROCEDURE — 74177 CT ABD & PELVIS W/CONTRAST: CPT

## 2023-04-19 PROCEDURE — 74011000636 HC RX REV CODE- 636: Performed by: EMERGENCY MEDICINE

## 2023-04-19 PROCEDURE — 93005 ELECTROCARDIOGRAM TRACING: CPT

## 2023-04-19 PROCEDURE — 36415 COLL VENOUS BLD VENIPUNCTURE: CPT

## 2023-04-19 PROCEDURE — 81001 URINALYSIS AUTO W/SCOPE: CPT

## 2023-04-19 PROCEDURE — 81025 URINE PREGNANCY TEST: CPT

## 2023-04-19 PROCEDURE — 85025 COMPLETE CBC W/AUTO DIFF WBC: CPT

## 2023-04-19 RX ORDER — PANTOPRAZOLE SODIUM 40 MG/1
40 TABLET, DELAYED RELEASE ORAL DAILY
Qty: 30 TABLET | Refills: 0 | Status: SHIPPED | OUTPATIENT
Start: 2023-04-19

## 2023-04-19 RX ADMIN — IOPAMIDOL 100 ML: 755 INJECTION, SOLUTION INTRAVENOUS at 21:06

## 2023-04-19 NOTE — ED TRIAGE NOTES
used in Triage ([facility] 1042)    Pt presents to the ED with c/o chest pain, diarrhea and nausea. Pt states that it started last Thursday and that it comes and goes. Pt reports diarrhea, nausea and headache that started three days ago.

## 2023-04-19 NOTE — ED PROVIDER NOTES
Chest Pain (Angina)   Associated symptoms include nausea. Diarrhea   Associated symptoms include diarrhea, nausea and chest pain. Nausea   Associated symptoms include diarrhea. Past Medical History:   Diagnosis Date    H/O mammogram 2012    Pap smear for cervical cancer screening 2012       No past surgical history on file. No family history on file. Social History     Socioeconomic History    Marital status: SINGLE     Spouse name: Not on file    Number of children: Not on file    Years of education: Not on file    Highest education level: Not on file   Occupational History    Not on file   Tobacco Use    Smoking status: Never    Smokeless tobacco: Never   Substance and Sexual Activity    Alcohol use: No     Alcohol/week: 0.0 standard drinks    Drug use: No    Sexual activity: Not on file   Other Topics Concern    Not on file   Social History Narrative    Not on file     Social Determinants of Health     Financial Resource Strain: Not on file   Food Insecurity: Not on file   Transportation Needs: Not on file   Physical Activity: Not on file   Stress: Not on file   Social Connections: Not on file   Intimate Partner Violence: Not on file   Housing Stability: Not on file         ALLERGIES: Patient has no known allergies. Review of Systems   Cardiovascular:  Positive for chest pain. Gastrointestinal:  Positive for diarrhea and nausea. Vitals:    04/19/23 1933   BP: 113/67   Pulse: 80   Resp: 18   Temp: 98.2 °F (36.8 °C)   SpO2: 99%            Physical Exam     Medical Decision Making  Amount and/or Complexity of Data Reviewed  Labs: ordered.            Procedures

## 2023-04-20 NOTE — DISCHARGE INSTRUCTIONS
You were seen in the emergency department for chest and abdominal pain. The results of your tests were reassuring. Although an exact cause of your symptoms was not identified, the most likely cause is gastritis. Please take any medications prescribed at this visit as instructed. Please follow-up with your PCP or return to the emergency department if you experience a worsening of symptoms or any new symptoms that are concerning to you.

## 2023-04-21 LAB
ATRIAL RATE: 67 BPM
CALCULATED P AXIS, ECG09: 41 DEGREES
CALCULATED R AXIS, ECG10: 29 DEGREES
CALCULATED T AXIS, ECG11: 34 DEGREES
DIAGNOSIS, 93000: NORMAL
P-R INTERVAL, ECG05: 150 MS
Q-T INTERVAL, ECG07: 370 MS
QRS DURATION, ECG06: 72 MS
QTC CALCULATION (BEZET), ECG08: 390 MS
VENTRICULAR RATE, ECG03: 67 BPM

## 2023-05-24 RX ORDER — FERROUS SULFATE 325(65) MG
325 TABLET ORAL 2 TIMES DAILY WITH MEALS
COMMUNITY
Start: 2018-03-16

## 2023-05-24 RX ORDER — KETOCONAZOLE 20 MG/G
CREAM TOPICAL 2 TIMES DAILY
COMMUNITY
Start: 2017-02-09

## 2023-05-24 RX ORDER — PANTOPRAZOLE SODIUM 40 MG/1
40 TABLET, DELAYED RELEASE ORAL DAILY
COMMUNITY
Start: 2023-04-19

## 2023-05-24 RX ORDER — ACETAMINOPHEN, ASPIRIN AND CAFFEINE 250; 250; 65 MG/1; MG/1; MG/1
1 TABLET, FILM COATED ORAL EVERY 6 HOURS PRN
COMMUNITY
Start: 2018-03-15